# Patient Record
Sex: FEMALE | Race: WHITE | NOT HISPANIC OR LATINO | URBAN - METROPOLITAN AREA
[De-identification: names, ages, dates, MRNs, and addresses within clinical notes are randomized per-mention and may not be internally consistent; named-entity substitution may affect disease eponyms.]

---
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
## 2018-04-27 ENCOUNTER — OFFICE VISIT (OUTPATIENT)
Dept: FAMILY MEDICINE | Facility: CLINIC | Age: 18
End: 2018-04-27
Payer: COMMERCIAL

## 2018-04-27 VITALS
BODY MASS INDEX: 22.45 KG/M2 | OXYGEN SATURATION: 98 % | HEIGHT: 62 IN | TEMPERATURE: 99 F | SYSTOLIC BLOOD PRESSURE: 115 MMHG | HEART RATE: 103 BPM | DIASTOLIC BLOOD PRESSURE: 71 MMHG | WEIGHT: 122 LBS

## 2018-04-27 DIAGNOSIS — R10.31 BILATERAL LOWER ABDOMINAL PAIN: ICD-10-CM

## 2018-04-27 DIAGNOSIS — R30.0 DYSURIA: Primary | ICD-10-CM

## 2018-04-27 DIAGNOSIS — R10.32 BILATERAL LOWER ABDOMINAL PAIN: ICD-10-CM

## 2018-04-27 LAB
ALBUMIN UR-MCNC: NEGATIVE MG/DL
APPEARANCE UR: CLEAR
BILIRUB UR QL STRIP: NEGATIVE
COLOR UR AUTO: YELLOW
ERYTHROCYTE [DISTWIDTH] IN BLOOD BY AUTOMATED COUNT: 12 % (ref 10–15)
GLUCOSE UR STRIP-MCNC: NEGATIVE MG/DL
HCT VFR BLD AUTO: 41.1 % (ref 35–47)
HGB BLD-MCNC: 14 G/DL (ref 11.7–15.7)
HGB UR QL STRIP: NEGATIVE
KETONES UR STRIP-MCNC: NEGATIVE MG/DL
LEUKOCYTE ESTERASE UR QL STRIP: NEGATIVE
MCH RBC QN AUTO: 31.4 PG (ref 26.5–33)
MCHC RBC AUTO-ENTMCNC: 34.1 G/DL (ref 31.5–36.5)
MCV RBC AUTO: 92 FL (ref 78–100)
NITRATE UR QL: NEGATIVE
PH UR STRIP: 7 PH (ref 5–7)
PLATELET # BLD AUTO: 323 10E9/L (ref 150–450)
RBC # BLD AUTO: 4.46 10E12/L (ref 3.8–5.2)
SOURCE: NORMAL
SP GR UR STRIP: 1.01 (ref 1–1.03)
UROBILINOGEN UR STRIP-ACNC: 0.2 EU/DL (ref 0.2–1)
WBC # BLD AUTO: 11.6 10E9/L (ref 4–11)

## 2018-04-27 PROCEDURE — 85027 COMPLETE CBC AUTOMATED: CPT | Performed by: NURSE PRACTITIONER

## 2018-04-27 PROCEDURE — 36415 COLL VENOUS BLD VENIPUNCTURE: CPT | Performed by: NURSE PRACTITIONER

## 2018-04-27 PROCEDURE — 99213 OFFICE O/P EST LOW 20 MIN: CPT | Performed by: NURSE PRACTITIONER

## 2018-04-27 PROCEDURE — 81003 URINALYSIS AUTO W/O SCOPE: CPT | Performed by: NURSE PRACTITIONER

## 2018-04-27 ASSESSMENT — PAIN SCALES - GENERAL: PAINLEVEL: SEVERE PAIN (6)

## 2018-04-27 NOTE — PROGRESS NOTES
SUBJECTIVE:   Lashanda Calle is a 17 year old female who presents to clinic today for the following health issues:      Patient is here today with the concern for poss. Kidney stone. She has had in the past kidney stones 4 times, she is having low back pain and lower abd. Pain. New symptom is she is bleeding from the rectum and has diarrhea x 1 day    She woke up at 0300 today and had sudden urge to defecate  She had liquid stool with bloody mucus on toilet paper and in toilet bowl  She has passed several stools and 3 additional times had similar blood  Pain in lower abdomen radiates to bilateral low back  Had flank pain before with kidney stones but never lower abdomen pain (this feels different than previous kidney stones)  Denies rectal pain, itching  No history of hemorrhoids, anal fissure  LMP 4/21/18  She is not sexually active  Denies vaginal discharge, odor, pelvic pain  She has not taken any tylenol or ASA today  Feeling weak, does not feel like she may pass out    Her mother has ulcerative colitis    No antibiotics in past three months  No recent travel   She has not been around any sick contacts, not been to a hospital or LTC facility  No known exposure to cdiff      Problem list and histories reviewed & adjusted, as indicated.  Additional history: none    Patient Active Problem List   Diagnosis     Generalized anxiety disorder     Dysmenorrhea     Major depressive disorder, recurrent episode, moderate (H)     History reviewed. No pertinent surgical history.    Social History   Substance Use Topics     Smoking status: Never Smoker     Smokeless tobacco: Never Used     Alcohol use No     Family History   Problem Relation Age of Onset     Hypertension Father      DIABETES No family hx of      CEREBROVASCULAR DISEASE No family hx of      Breast Cancer No family hx of      Cancer - colorectal No family hx of      Prostate Cancer No family hx of      CANCER No family hx of      Cardiovascular No family hx of   "          Reviewed and updated as needed this visit by clinical staff  Tobacco  Allergies  Meds  Med Hx  Surg Hx  Fam Hx  Soc Hx      Reviewed and updated as needed this visit by Provider         ROS:  Constitutional, HEENT, cardiovascular, pulmonary, gi and gu systems are negative, except as otherwise noted.    OBJECTIVE:     /71 (BP Location: Left arm, Patient Position: Chair, Cuff Size: Adult Regular)  Pulse 103  Temp 99  F (37.2  C) (Oral)  Ht 5' 1.5\" (1.562 m)  Wt 122 lb (55.3 kg)  LMP 04/21/2018  SpO2 98%  Breastfeeding? No  BMI 22.68 kg/m2  Body mass index is 22.68 kg/(m^2).  GENERAL: healthy, alert and no distress  RESP: lungs clear to auscultation - no rales, rhonchi or wheezes  CV: regular rate and rhythm, normal S1 S2, no S3 or S4, no murmur, click or rub, no peripheral edema and peripheral pulses strong  ABDOMEN: soft, tenderness to palpation bilateral lower abdomen with guarding, no rebound tenderness, negative psoas, no hepatosplenomegaly, no masses and bowel sounds normal  BACK: no CVA tenderness    Diagnostic Test Results:  Results for orders placed or performed in visit on 04/27/18   UA reflex to Microscopic and Culture   Result Value Ref Range    Color Urine Yellow     Appearance Urine Clear     Glucose Urine Negative NEG^Negative mg/dL    Bilirubin Urine Negative NEG^Negative    Ketones Urine Negative NEG^Negative mg/dL    Specific Gravity Urine 1.010 1.003 - 1.035    Blood Urine Negative NEG^Negative    pH Urine 7.0 5.0 - 7.0 pH    Protein Albumin Urine Negative NEG^Negative mg/dL    Urobilinogen Urine 0.2 0.2 - 1.0 EU/dL    Nitrite Urine Negative NEG^Negative    Leukocyte Esterase Urine Negative NEG^Negative    Source Midstream Urine    CBC with platelets   Result Value Ref Range    WBC 11.6 (H) 4.0 - 11.0 10e9/L    RBC Count 4.46 3.8 - 5.2 10e12/L    Hemoglobin 14.0 11.7 - 15.7 g/dL    Hematocrit 41.1 35.0 - 47.0 %    MCV 92 78 - 100 fl    MCH 31.4 26.5 - 33.0 pg    MCHC " 34.1 31.5 - 36.5 g/dL    RDW 12.0 10.0 - 15.0 %    Platelet Count 323 150 - 450 10e9/L       ASSESSMENT/PLAN:       ICD-10-CM    1. Dysuria R30.0 UA reflex to Microscopic and Culture     CBC with platelets     CT Abdomen Pelvis w Contrast     CANCELED: CT Abdomen w Contrast   2. Bilateral lower abdominal pain R10.31 CT Abdomen Pelvis w Contrast    R10.32 CANCELED: CT Abdomen w Contrast     As we are approaching the weekend, CT can not be done until Monday. She is currently stable, slightly elevated WBC with tachycardia. Not consistent with appendicitis so I don't think it's necessary to go to ED. Recommend clear liquid diet. She is scheduled for CT Monday morning. If symptoms worsen over the weekend she will go to ED. She voiced understanding.     Patient Instructions   I recommend a clear liquid diet (no red)  If symptoms worsen over the weekend, you MUST go to the emergency room. If worsening pain, nausea, vomiting, more frequent passage of blood in stool, feeling lightheaded like you must pass out, fever                                 NITISH Chiang [facility]    This encounter has been reviewed.  The patient was not examined by me.  NIURKA CHURCH M.D.   Supervising Physician in Internal Medicine, [facility].

## 2018-04-27 NOTE — PATIENT INSTRUCTIONS
I recommend a clear liquid diet (no red)  If symptoms worsen over the weekend, you MUST go to the emergency room. If worsening pain, nausea, vomiting, more frequent passage of blood in stool, feeling lightheaded like you must pass out, fever

## 2018-04-27 NOTE — MR AVS SNAPSHOT
After Visit Summary   4/27/2018    Lashanda Calle    MRN: [id number]           Patient Information     Date Of Birth          2000        Visit Information        Provider Department      4/27/2018 1:40 PM Li Klein APRN CNP [facility]        Today's Diagnoses     Dysuria    -  1    Bilateral lower abdominal pain          Care Instructions    I recommend a clear liquid diet (no red)  If symptoms worsen over the weekend, you MUST go to the emergency room. If worsening pain, nausea, vomiting, more frequent passage of blood in stool, feeling lightheaded like you must pass out, fever                                     Follow-ups after your visit        Your next 10 appointments already scheduled     Apr 30, 2018 10:30 AM CDT   (Arrive by 10:15 AM)   CT [facility]BD[facility] W [facility]CT[facility]    [address] [facility] [address] [phone]           Please bring any scans or X-rays taken at other hospitals, if similar tests were done. Also bring a list of your medicines, including vitamins, minerals and over-the-counter drugs. It is safest to leave personal items at home.  Be sure to tell your doctor:   If you have any allergies.   If there s any chance you are pregnant.   If you are breastfeeding.  How to prepare:   Do not eat or drink for 2 hours before your exam. If you need to take medicine, you may take it with small sips of water. (We may ask you to take liquid medicine as well.)   Please wear loose clothing, such as a sweat suit or jogging clothes. Avoid snaps, zippers and other metal. We may ask you to undress and put on a hospital gown.  Please arrive 30 minutes early for your CT. Once in the department you might be asked to drink water 15-20 minutes prior to your exam.  If indicated you may be asked to drink an oral contrast in advance of your CT.  If this is the case, the imaging team will let  "you know or be in contact with you prior to your appointment  Patients over 70 or patients with diabetes or kidney problems:   If you haven t had a blood test (creatinine test) within the last 30 days, the Cardiologist/Radiologist may require you to get this test prior to your exam.  If you have diabetes:   Continue to take your metformin medication on the day of your exam  If you have any questions, please call the Imaging Department where you will have your exam.              Future tests that were ordered for you today     Open Future Orders        Priority Expected Expires Ordered    CT Abdomen w Contrast Routine  4/27/2019 4/27/2018            Who to contact     If you have questions or need follow up information about today's clinic visit or your schedule please contact [facility] directly at [phone].  Normal or non-critical lab and imaging results will be communicated to you by MyChart, letter or phone within 4 business days after the clinic has received the results. If you do not hear from us within 7 days, please contact the clinic through MyChart or phone. If you have a critical or abnormal lab result, we will notify you by phone as soon as possible.  Submit refill requests through [facility] or call your pharmacy and they will forward the refill request to us. Please allow 3 business days for your refill to be completed.          Additional Information About Your Visit        [facility] Information     [facility] lets you send messages to your doctor, view your test results, renew your prescriptions, schedule appointments and more. To sign up, go to [email or web address] . Click on \"Log in\" on the left side of the screen, which will take you to the Welcome page. Then click on \"Sign up Now\" on the right side of the page.     You will be asked to enter the access code listed below, as well as some personal information. Please follow the directions to create your username and " "password.     Your access code is: [email or web address]  Expires: 2018  2:49 PM     Your access code will  in 90 days. If you need help or a new code, please call your [facility] clinic or [phone].        Care EveryWhere ID     This is your Care EveryWhere ID. This could be used by other organizations to access your [facility] medical records  [id number]        Your Vitals Were     Pulse Temperature Height Last Period Pulse Oximetry Breastfeeding?    103 99  F (37.2  C) (Oral) 5' 1.5\" (1.562 m) 2018 98% No    BMI (Body Mass Index)                   22.68 kg/m2            Blood Pressure from Last 3 Encounters:   18 115/71   17 117/78   17 121/75    Weight from Last 3 Encounters:   18 122 lb (55.3 kg) (38 %)*   17 118 lb (53.5 kg) (32 %)*   17 119 lb (54 kg) (34 %)*     * Growth percentiles are based on CDC 2-20 Years data.              We Performed the Following     CBC with platelets     UA reflex to Microscopic and Culture        Primary Care Provider Office Phone # Fax #    Nuria Turner PA-C [phone]       [address] [facility] [address]        Equal Access to Services     CAMI HALE : Hadii aad ku hadasho Soanahi, waaxda luqadaha, qaybta kaalmada shubham, nano hollins . So [facility] [phone].    ATENCIÓN: Si habla español, tiene a naylor disposición servicios gratuitos de asistencia lingüística. Jose al [phone].    We comply with applicable federal civil rights laws and [place] laws. We do not discriminate on the basis of race, color, national origin, age, disability, sex, sexual orientation, or gender identity.            Thank you!     Thank you for choosing [facility]  for your care. Our goal is always to provide you with excellent care. Hearing back from our patients is one way we can continue to improve our services. Please take a few minutes to complete the " written survey that you may receive in the mail after your visit with us. Thank you!             Your Updated Medication List - Protect others around you: Learn how to safely use, store and throw away your medicines at www.disposemymeds.org.          This list is accurate as of 4/27/18  2:51 PM.  Always use your most recent med list.                   Brand Name Dispense Instructions for use Diagnosis    FLUoxetine 20 MG capsule    PROzac    30 capsule    Take 1 capsule (20 mg) by mouth daily    Major depressive disorder, recurrent episode, moderate (H), Generalized anxiety disorder       norgestimate-ethinyl estradiol 0.25-35 MG-MCG per tablet    ORTHO-CYCLEN, SPRINTEC    84 tablet    Take 1 tablet by mouth daily    Irregular periods, Dysmenorrhea

## 2018-04-27 NOTE — LETTER
[address] [address]  Phone: [phone]  Fax: [phone]    April 27, 2018        Lashanda Calle  [address] [place] [id number]          To whom it may concern:    RE: Lashanda Calle    Patient was seen and treated today at our clinic. Please excuse her absences from work April 27-April 30.     Please contact me for questions or concerns.      Sincerely,        NITISH Chiang CNP

## 2018-04-28 ASSESSMENT — PATIENT HEALTH QUESTIONNAIRE - PHQ9: SUM OF ALL RESPONSES TO PHQ QUESTIONS 1-9: 5

## 2018-04-30 ENCOUNTER — RADIANT APPOINTMENT (OUTPATIENT)
Dept: CT IMAGING | Facility: CLINIC | Age: 18
End: 2018-04-30
Attending: NURSE PRACTITIONER
Payer: COMMERCIAL

## 2018-04-30 DIAGNOSIS — R10.31 BILATERAL LOWER ABDOMINAL PAIN: ICD-10-CM

## 2018-04-30 DIAGNOSIS — R30.0 DYSURIA: ICD-10-CM

## 2018-04-30 DIAGNOSIS — R10.32 BILATERAL LOWER ABDOMINAL PAIN: ICD-10-CM

## 2018-04-30 PROCEDURE — 74177 CT ABD & PELVIS W/CONTRAST: CPT | Mod: TC

## 2018-04-30 RX ORDER — IOPAMIDOL 755 MG/ML
59 INJECTION, SOLUTION INTRAVASCULAR ONCE
Status: COMPLETED | OUTPATIENT
Start: 2018-04-30 | End: 2018-04-30

## 2018-04-30 RX ADMIN — IOPAMIDOL 59 ML: 755 INJECTION, SOLUTION INTRAVASCULAR at 10:44

## 2018-05-01 ENCOUNTER — TELEPHONE (OUTPATIENT)
Dept: FAMILY MEDICINE | Facility: CLINIC | Age: 18
End: 2018-05-01

## 2018-05-01 DIAGNOSIS — R91.1 LUNG NODULE: Primary | ICD-10-CM

## 2018-05-01 NOTE — TELEPHONE ENCOUNTER
Patient returned call, I lynced Li but no response.  Advised patient of both part of note regarding CT.   She says her pain is resolving but still constipated, last BM was Sunday.   She was planning to get some over the counter med for this after work today.   I advised perhaps senna and colace.  Also increase fluids and fiber.    She does not sound too worried about the lung issue and verbalized understanding of routine CT to follow up on that in about a year.    Routed back to Li to advise on any other advice or follow up needed.    Erika Shah RN  [facility]

## 2018-05-01 NOTE — TELEPHONE ENCOUNTER
I left voicemail for patient to return call to clinic.  If I'm not available please relay the message:    Abdominal CT did not reveal any acute abnormalities to explain her pain and change in stool. No signs of colitis, appendicitis, diverticulitis. How are her symptoms? If she is continuing to have symptoms I will refer her to GI. Please let me know if I can put in a referral.    The CT did show an incidental lung nodule of left lower lobe. Sometimes when we do imaging we find other things that we are not looking for and they often times are benign. She is very low risk for this being a concerning nodule. Even so, I would like to do a chest CT in 1 year just to further evaluate the lung nodule and see if it is changing. I ordered it, but this could be done sometime next year late winter.

## 2018-07-12 DIAGNOSIS — N94.6 DYSMENORRHEA: ICD-10-CM

## 2018-07-12 DIAGNOSIS — N92.6 IRREGULAR PERIODS: ICD-10-CM

## 2018-07-12 NOTE — TELEPHONE ENCOUNTER
"Requested Prescriptions   Pending Prescriptions Disp Refills     MONO-LINYAH 0.25-35 MG-MCG per tablet [Pharmacy Med Name: MONO-LINYAH 0.25-35 MG-MCG TABS] 84 tablet 3    Last Written Prescription Date:  8/22/17  Last Fill Quantity: 84,  # refills: 3   Last office visit: 4/27/2018 with prescribing provider:     Future Office Visit:     Sig: TAKE ONE TABLET BY MOUTH EVERY DAY    Contraceptives Protocol Passed    7/12/2018  4:52 PM       Passed - Patient is not a current smoker if age is 35 or older       Passed - Recent (12 mo) or future (30 days) visit within the authorizing provider's specialty    Patient had office visit in the last 12 months or has a visit in the next 30 days with authorizing provider or within the authorizing provider's specialty.  See \"Patient Info\" tab in inbasket, or \"Choose Columns\" in Meds & Orders section of the refill encounter.           Passed - No active pregnancy on record       Passed - No positive pregnancy test in past 12 months          "

## 2018-07-14 RX ORDER — NORGESTIMATE AND ETHINYL ESTRADIOL 0.25-0.035
KIT ORAL
Qty: 84 TABLET | Refills: 3 | Status: SHIPPED | OUTPATIENT
Start: 2018-07-14 | End: 2019-06-13

## 2018-10-23 ENCOUNTER — OFFICE VISIT (OUTPATIENT)
Dept: FAMILY MEDICINE | Facility: CLINIC | Age: 18
End: 2018-10-23
Payer: COMMERCIAL

## 2018-10-23 VITALS
DIASTOLIC BLOOD PRESSURE: 69 MMHG | SYSTOLIC BLOOD PRESSURE: 110 MMHG | OXYGEN SATURATION: 98 % | TEMPERATURE: 98.4 F | WEIGHT: 125 LBS | BODY MASS INDEX: 23.24 KG/M2 | HEART RATE: 66 BPM

## 2018-10-23 DIAGNOSIS — Z11.3 SCREENING EXAMINATION FOR VENEREAL DISEASE: ICD-10-CM

## 2018-10-23 DIAGNOSIS — F41.1 GENERALIZED ANXIETY DISORDER: ICD-10-CM

## 2018-10-23 DIAGNOSIS — F33.1 MAJOR DEPRESSIVE DISORDER, RECURRENT EPISODE, MODERATE (H): ICD-10-CM

## 2018-10-23 DIAGNOSIS — N89.8 VAGINAL DISCHARGE: Primary | ICD-10-CM

## 2018-10-23 DIAGNOSIS — B37.31 YEAST INFECTION OF THE VAGINA: ICD-10-CM

## 2018-10-23 LAB
SPECIMEN SOURCE: ABNORMAL
WET PREP SPEC: ABNORMAL

## 2018-10-23 PROCEDURE — 99213 OFFICE O/P EST LOW 20 MIN: CPT | Performed by: PHYSICIAN ASSISTANT

## 2018-10-23 PROCEDURE — 87591 N.GONORRHOEAE DNA AMP PROB: CPT | Performed by: PHYSICIAN ASSISTANT

## 2018-10-23 PROCEDURE — 87491 CHLMYD TRACH DNA AMP PROBE: CPT | Performed by: PHYSICIAN ASSISTANT

## 2018-10-23 PROCEDURE — 87210 SMEAR WET MOUNT SALINE/INK: CPT | Performed by: PHYSICIAN ASSISTANT

## 2018-10-23 RX ORDER — FLUCONAZOLE 150 MG/1
150 TABLET ORAL ONCE
Qty: 1 TABLET | Refills: 0 | Status: SHIPPED | OUTPATIENT
Start: 2018-10-23 | End: 2019-04-08

## 2018-10-23 ASSESSMENT — ANXIETY QUESTIONNAIRES
7. FEELING AFRAID AS IF SOMETHING AWFUL MIGHT HAPPEN: NOT AT ALL
IF YOU CHECKED OFF ANY PROBLEMS ON THIS QUESTIONNAIRE, HOW DIFFICULT HAVE THESE PROBLEMS MADE IT FOR YOU TO DO YOUR WORK, TAKE CARE OF THINGS AT HOME, OR GET ALONG WITH OTHER PEOPLE: SOMEWHAT DIFFICULT
1. FEELING NERVOUS, ANXIOUS, OR ON EDGE: MORE THAN HALF THE DAYS
6. BECOMING EASILY ANNOYED OR IRRITABLE: MORE THAN HALF THE DAYS
GAD7 TOTAL SCORE: 7
2. NOT BEING ABLE TO STOP OR CONTROL WORRYING: SEVERAL DAYS
3. WORRYING TOO MUCH ABOUT DIFFERENT THINGS: SEVERAL DAYS
5. BEING SO RESTLESS THAT IT IS HARD TO SIT STILL: NOT AT ALL

## 2018-10-23 ASSESSMENT — PATIENT HEALTH QUESTIONNAIRE - PHQ9: 5. POOR APPETITE OR OVEREATING: SEVERAL DAYS

## 2018-10-23 NOTE — LETTER
[facility]   [address] [place]  [address]  [phone]                                   October 26, 2018    Lashanda Calle  [address] [place] [address]        Dear Lashanda,    STD screening was negative.     Results for orders placed or performed in visit on 10/23/18   NEISSERIA GONORRHOEA PCR   Result Value Ref Range    Specimen Descrip Urine     N Gonorrhea PCR Negative NEG^Negative   CHLAMYDIA TRACHOMATIS PCR   Result Value Ref Range    Specimen Description Urine     Chlamydia Trachomatis PCR Negative NEG^Negative   Wet prep   Result Value Ref Range    Specimen Description Vagina     Wet Prep No Trichomonas seen     Wet Prep No clue cells seen     Wet Prep Yeast seen (A)        If you have any questions please call the clinic at [phone]    Sincerely,    Nuria Turner PA-C  bmd

## 2018-10-23 NOTE — MR AVS SNAPSHOT
After Visit Summary   10/23/2018    Lashanda Calle    MRN: [id number]           Patient Information     Date Of Birth          2000        Visit Information        Provider Department      10/23/2018 3:40 PM Nuria Turner PA-C [facility]        Today's Diagnoses     Vaginal discharge    -  1    Screening examination for venereal disease        Major depressive disorder, recurrent episode, moderate (H)        Generalized anxiety disorder        Yeast infection of the vagina          Care Instructions    Billing/ Insurance    There will be a charge that will be billed to your insurance company. If your insurance does not cover it, it will be billed to you. It is charged based on the time spent on the telephone with you in increments of 10 minutes of medical discussion. You may wish to check with your insurance company to see if they cover telephone visits.    Cost of Phone Visits/ E-Visits    5 to 10 minutes is $30.00  11 to 20 minutes is $59.00  21 to 30 minutes is $85.00     E-Visit is $35.00    Before Visit    Before your visit with the provider, a staff person will call you to obtain a second verbal consent, verify your insurance company, home address, and phone number. We will also need to update your medication list and allergies.    In the end, if you wish to see the provider in the office instead, we can help you make your in person appointment. Through [employer], you can also send and E-Visit Request and the provider will respond to you within 24 hours.            Follow-ups after your visit        Follow-up notes from your care team     Return in about 4 weeks (around 11/20/2018) for mood.      Who to contact     If you have questions or need follow up information about today's clinic visit or your schedule please contact [facility] directly at [phone].  Normal or non-critical lab and imaging results will be communicated to you by  MyChart, letter or phone within 4 business days after the clinic has received the results. If you do not hear from us within 7 days, please contact the clinic through MyChart or phone. If you have a critical or abnormal lab result, we will notify you by phone as soon as possible.  Submit refill requests through [employer] or call your pharmacy and they will forward the refill request to us. Please allow 3 business days for your refill to be completed.          Additional Information About Your Visit        Care EveryWhere ID     This is your Care EveryWhere ID. This could be used by other organizations to access your [facility] medical records  [id number]        Your Vitals Were     Pulse Temperature Last Period Pulse Oximetry BMI (Body Mass Index)       66 98.4  F (36.9  C) (Oral) 09/23/2018 98% 23.24 kg/m2        Blood Pressure from Last 3 Encounters:   10/23/18 110/69   04/27/18 115/71   09/12/17 117/78    Weight from Last 3 Encounters:   10/23/18 125 lb (56.7 kg)   04/27/18 122 lb (55.3 kg) (38 %)*   09/12/17 118 lb (53.5 kg) (32 %)*     * Growth percentiles are based on CDC 2-20 Years data.              We Performed the Following     CHLAMYDIA TRACHOMATIS PCR     DEPRESSION ACTION PLAN (DAP)     NEISSERIA GONORRHOEA PCR     Wet prep          Today's Medication Changes          These changes are accurate as of 10/23/18  4:38 PM.  If you have any questions, ask your nurse or doctor.               Start taking these medicines.        Dose/Directions    fluconazole 150 MG tablet   Commonly known as:  DIFLUCAN   Used for:  Yeast infection of the vagina   Started by:  Nuria Turner PA-C        Dose:  150 mg   Take 1 tablet (150 mg) by mouth once for 1 dose   Quantity:  1 tablet   Refills:  0            Where to get your medicines      These medications were sent to [facility] - [place], [place] - [address]  [address], [facility] [address]     Phone:   [phone]     fluconazole 150 MG tablet    FLUoxetine 20 MG capsule                Primary Care Provider Office Phone # Fax #    Nuria Turner PA-C [phone]       [address] [facility] [address]        Equal Access to Services     CAMI HALE : Hadii prashant galvez morgano Soomaali, waaxda luqadaha, qaybta kaalmada adeegyada, waxanibal davidson rhyscassandra bustillostannerjuaquin looney. So [facility] [phone].    ATENCIÓN: Si habla español, tiene a naylor disposición servicios gratuitos de asistencia lingüística. Llame al [phone].    We comply with applicable federal civil rights laws and [place] laws. We do not discriminate on the basis of race, color, national origin, age, disability, sex, sexual orientation, or gender identity.            Thank you!     Thank you for choosing [facility]  for your care. Our goal is always to provide you with excellent care. Hearing back from our patients is one way we can continue to improve our services. Please take a few minutes to complete the written survey that you may receive in the mail after your visit with us. Thank you!             Your Updated Medication List - Protect others around you: Learn how to safely use, store and throw away your medicines at www.disposemymeds.org.          This list is accurate as of 10/23/18  4:38 PM.  Always use your most recent med list.                   Brand Name Dispense Instructions for use Diagnosis    fluconazole 150 MG tablet    DIFLUCAN    1 tablet    Take 1 tablet (150 mg) by mouth once for 1 dose    Yeast infection of the vagina       FLUoxetine 20 MG capsule    PROzac    30 capsule    Take 1 capsule (20 mg) by mouth daily    Major depressive disorder, recurrent episode, moderate (H), Generalized anxiety disorder       MONO-LINYAH 0.25-35 MG-MCG per tablet   Generic drug:  norgestimate-ethinyl estradiol     84 tablet    TAKE ONE TABLET BY MOUTH EVERY DAY    Irregular periods, Dysmenorrhea

## 2018-10-23 NOTE — PATIENT INSTRUCTIONS
Billing/ Insurance    There will be a charge that will be billed to your insurance company. If your insurance does not cover it, it will be billed to you. It is charged based on the time spent on the telephone with you in increments of 10 minutes of medical discussion. You may wish to check with your insurance company to see if they cover telephone visits.    Cost of Phone Visits/ E-Visits    5 to 10 minutes is $30.00  11 to 20 minutes is $59.00  21 to 30 minutes is $85.00     E-Visit is $35.00    Before Visit    Before your visit with the provider, a staff person will call you to obtain a second verbal consent, verify your insurance company, home address, and phone number. We will also need to update your medication list and allergies.    In the end, if you wish to see the provider in the office instead, we can help you make your in person appointment. Through [employer], you can also send and E-Visit Request and the provider will respond to you within 24 hours.

## 2018-10-23 NOTE — LETTER
My Depression Action Plan  Name: Lashanda Calle   Date of Birth 2000  Date: 10/23/2018    My doctor: Nuria Turner   My clinic: [address] [id number] [phone]          GREEN    ZONE   Good Control    What it looks like:     Things are going generally well. You have normal up s and down s. You may even feel depressed from time to time, but bad moods usually last less than a day.   What you need to do:  1. Continue to care for yourself (see self care plan)  2. Check your depression survival kit and update it as needed  3. Follow your physician s recommendations including any medication.  4. Do not stop taking medication unless you consult with your physician first.           YELLOW         ZONE Getting Worse    What it looks like:     Depression is starting to interfere with your life.     It may be hard to get out of bed; you may be starting to isolate yourself from others.    Symptoms of depression are starting to last most all day and this has happened for several days.     You may have suicidal thoughts but they are not constant.   What you need to do:     1. Call your care team, your response to treatment will improve if you keep your care team informed of your progress. Yellow periods are signs an adjustment may need to be made.     2. Continue your self-care, even if you have to fake it!    3. Talk to someone in your support network    4. Open up your depression survival kit           RED    ZONE Medical Alert - Get Help    What it looks like:     Depression is seriously interfering with your life.     You may experience these or other symptoms: You can t get out of bed most days, can t work or engage in other necessary activities, you have trouble taking care of basic hygiene, or basic responsibilities, thoughts of suicide or death that will not go away, self-injurious behavior.     What you need to  do:  1. Call your care team and request a same-day appointment. If they are not available (weekends or after hours) call your local crisis line, emergency room or 911.            Depression Self Care Plan / Survival Kit    Self-Care for Depression  Here s the deal. Your body and mind are really not as separate as most people think.  What you do and think affects how you feel and how you feel influences what you do and think. This means if you do things that people who feel good do, it will help you feel better.  Sometimes this is all it takes.  There is also a place for medication and therapy depending on how severe your depression is, so be sure to consult with your medical provider and/ or Behavioral Health Consultant if your symptoms are worsening or not improving.     In order to better manage my stress, I will:    Exercise  Get some form of exercise, every day. This will help reduce pain and release endorphins, the  feel good  chemicals in your brain. This is almost as good as taking antidepressants!  This is not the same as joining a gym and then never going! (they count on that by the way ) It can be as simple as just going for a walk or doing some gardening, anything that will get you moving.      Hygiene   Maintain good hygiene (Get out of bed in the morning, Make your bed, Brush your teeth, Take a shower, and Get dressed like you were going to work, even if you are unemployed).  If your clothes don't fit try to get ones that do.    Diet  I will strive to eat foods that are good for me, drink plenty of water, and avoid excessive sugar, caffeine, alcohol, and other mood-altering substances.  Some foods that are helpful in depression are: complex carbohydrates, B vitamins, flaxseed, fish or fish oil, fresh fruits and vegetables.    Psychotherapy  I agree to participate in Individual Therapy (if recommended).    Medication  If prescribed medications, I agree to take them.  Missing doses can result in serious  side effects.  I understand that drinking alcohol, or other illicit drug use, may cause potential side effects.  I will not stop my medication abruptly without first discussing it with my provider.    Staying Connected With Others  I will stay in touch with my friends, family members, and my primary care provider/team.    Use your imagination  Be creative.  We all have a creative side; it doesn t matter if it s oil painting, sand castles, or mud pies! This will also kick up the endorphins.    Witness Beauty  (AKA stop and smell the roses) Take a look outside, even in mid-winter. Notice colors, textures. Watch the squirrels and birds.     Service to others  Be of service to others.  There is always someone else in need.  By helping others we can  get out of ourselves  and remember the really important things.  This also provides opportunities for practicing all the other parts of the program.    Humor  Laugh and be silly!  Adjust your TV habits for less news and crime-drama and more comedy.    Control your stress  Try breathing deep, massage therapy, biofeedback, and meditation. Find time to relax each day.     My support system    Clinic Contact:  Phone number:    Contact 1:  Phone number:    Contact 2:  Phone number:    Synagogue/:  Phone number:    Therapist:  Phone number:    Local crisis center:    Phone number:    Other community support:  Phone number:

## 2018-10-24 ASSESSMENT — ANXIETY QUESTIONNAIRES: GAD7 TOTAL SCORE: 7

## 2018-10-24 ASSESSMENT — PATIENT HEALTH QUESTIONNAIRE - PHQ9: SUM OF ALL RESPONSES TO PHQ QUESTIONS 1-9: 4

## 2019-03-26 ENCOUNTER — TELEPHONE (OUTPATIENT)
Dept: FAMILY MEDICINE | Facility: CLINIC | Age: 19
End: 2019-03-26

## 2019-03-26 DIAGNOSIS — R09.89 SINUS COMPLAINT: Primary | ICD-10-CM

## 2019-03-26 NOTE — TELEPHONE ENCOUNTER
See contact information on referral sent today:    Comments     Your provider has referred you to: [facility]: [facility] [phone]   [email or web address]     Attempted to call patient at home/mobile number, left brief message on voicemail advising patient of phone number contact for requested referral; patient was instructed to return call to [facility] [place] RN directly on the RN call back line at [phone] only if she has further questions.   Erika Shah, RN  [facility]

## 2019-03-26 NOTE — TELEPHONE ENCOUNTER
I called patient back, she is willing to see ENT but has not seen one before and has not established any relationship with one in the past so she wants a referral.   She says within [facility] is fine with her.    Routed to PCP to place ENT referral.    Erika Shah RN  [facility]

## 2019-03-26 NOTE — TELEPHONE ENCOUNTER
"Patient was last seen 10/23/18 by Nuria Turner PA-C, phone or E-visit for \"mood\" was advised in 4 weeks from that date.    Has not been done.    Routed to Nuria Turner PA-C, would you be able to address the sinus \"bump\" as well via phone or e-visit?    Erika Shah RN  [facility]      "

## 2019-03-26 NOTE — TELEPHONE ENCOUNTER
Reason for Call:  Other / ENT referral    Detailed comments: Patient called and stated when they did XRays to remove her wisdom tooth there was an abnormal bump on her sinuses.  Patient also stated she would like to know if this is something her PCP would like to check into or if she would have to be referred to an ENT doctor.  Please call patient back to discuss.    Phone Number Patient can be reached at: Cell number on file:    Telephone Information:   Mobile [phone]     Best Time: Anytime    Can we leave a detailed message on this number? YES    Call taken on 3/26/2019 at 4:12 PM by Amalia Hameed

## 2019-04-08 ENCOUNTER — OFFICE VISIT (OUTPATIENT)
Dept: OTOLARYNGOLOGY | Facility: CLINIC | Age: 19
End: 2019-04-08
Payer: COMMERCIAL

## 2019-04-08 VITALS
OXYGEN SATURATION: 100 % | SYSTOLIC BLOOD PRESSURE: 124 MMHG | HEIGHT: 62 IN | BODY MASS INDEX: 23.55 KG/M2 | DIASTOLIC BLOOD PRESSURE: 80 MMHG | WEIGHT: 128 LBS | HEART RATE: 70 BPM

## 2019-04-08 DIAGNOSIS — J34.89 MAXILLARY SINUS MASS: Primary | ICD-10-CM

## 2019-04-08 PROCEDURE — 99203 OFFICE O/P NEW LOW 30 MIN: CPT | Performed by: OTOLARYNGOLOGY

## 2019-04-08 ASSESSMENT — MIFFLIN-ST. JEOR: SCORE: 1295.91

## 2019-04-08 NOTE — LETTER
4/8/2019         RE: Lashanda Calle  [address] [place] [address]        Dear Colleague,    Thank you for referring your patient, Lashanda Calle, to the [facility]. Please see a copy of my visit note below.    History of Present Illness - Lashanda Calle is a 18 year old female here to see me for the first time at the consultation of Rich Turner for sinus problems.    More specifically recently when she was being worked up for wisdom teeth removal, it was noted to her that there was a bump in her maxillary sinuses and it was suggested she follow up with ENT to have this examined and worked up.    She tells me that she has no history of chronic sinus disease.  No chronic nasal congestion, no recurrent sinus infections.  No chronic post nasal drainage, no changes in sense of smell or taste, no chronic facial pain.  However, she does get chronic frontal headache in the middle of her forehead for the last several months.      Past Medical History -   Patient Active Problem List   Diagnosis     Generalized anxiety disorder     Dysmenorrhea     Major depressive disorder, recurrent episode, moderate (H)       Current Medications -   Current Outpatient Medications:      FLUoxetine (PROZAC) 20 MG capsule, Take 1 capsule (20 mg) by mouth daily, Disp: 30 capsule, Rfl: 1     MONO-LINYAH 0.25-35 MG-MCG per tablet, TAKE ONE TABLET BY MOUTH EVERY DAY, Disp: 84 tablet, Rfl: 3    Allergies - No Known Allergies    Social History -   Social History     Socioeconomic History     Marital status: Single     Spouse name: Not on file     Number of children: Not on file     Years of education: Not on file     Highest education level: Not on file   Occupational History     Not on file   Social Needs     Financial resource strain: Not on file     Food insecurity:     Worry: Not on file     Inability: Not on file     Transportation needs:     Medical: Not on file     Non-medical: Not on file   Tobacco Use     Smoking status:  "Never Smoker     Smokeless tobacco: Never Used   Substance and Sexual Activity     Alcohol use: No     Drug use: Yes     Types: Marijuana     Sexual activity: Yes     Partners: Male     Birth control/protection: Condom, Pill   Lifestyle     Physical activity:     Days per week: Not on file     Minutes per session: Not on file     Stress: Not on file   Relationships     Social connections:     Talks on phone: Not on file     Gets together: Not on file     Attends Adventism service: Not on file     Active member of club or organization: Not on file     Attends meetings of clubs or organizations: Not on file     Relationship status: Not on file     Intimate partner violence:     Fear of current or ex partner: Not on file     Emotionally abused: Not on file     Physically abused: Not on file     Forced sexual activity: Not on file   Other Topics Concern     Parent/sibling w/ CABG, MI or angioplasty before 65F 55M? Not Asked   Social History Narrative     Not on file       Family History -   Family History   Problem Relation Age of Onset     Hypertension Father      Diabetes No family hx of      Cerebrovascular Disease No family hx of      Breast Cancer No family hx of      Cancer - colorectal No family hx of      Prostate Cancer No family hx of      Cancer No family hx of      Cardiovascular No family hx of        Review of Systems - As per HPI and PMHx, otherwise 10+ system review of the head and neck, and general constitution is negative.    Physical Exam  /80   Pulse 70   Ht 1.562 m (5' 1.5\")   Wt 58.1 kg (128 lb)   SpO2 100%   BMI 23.79 kg/m       General - The patient is well nourished and well developed, and appears to have good nutritional status.  Alert and oriented to person and place, answers questions and cooperates with examination appropriately.   Head and Face - Normocephalic and atraumatic, with no gross asymmetry noted of the contour of the facial features.  The facial nerve is intact, with " strong symmetric movements.  Voice and Breathing - The patient was breathing comfortably without the use of accessory muscles. There was no wheezing, stridor, or stertor.  The patients voice was clear and strong, and had appropriate pitch and quality.  Ears - The tympanic membranes are normal in appearance, bony landmarks are intact.  No retraction, perforation, or masses.  No fluid or purulence was seen in the external canal or the middle ear. No evidence of infection of the middle ear or external canal, cerumen was normal in appearance.  Eyes - Extraocular movements intact, and the pupils were reactive to light.  Sclera were not icteric or injected, conjunctiva were pink and moist.  Mouth - Examination of the oral cavity showed pink, healthy oral mucosa. No lesions or ulcerations noted.  The tongue was mobile and midline, and the dentition were in good condition.    Throat - The walls of the oropharynx were smooth, pink, moist, symmetric, and had no lesions or ulcerations.  The tonsillar pillars and soft palate were symmetric.  The uvula was midline on elevation.    Neck - Normal midline excursion of the laryngotracheal complex during swallowing.  Full range of motion on passive movement.  Palpation of the occipital, submental, submandibular, internal jugular chain, and supraclavicular nodes did not demonstrate any abnormal lymph nodes or masses.  The carotid pulse was palpable bilaterally.  Palpation of the thyroid was soft and smooth, with no nodules or goiter appreciated.  The trachea was mobile and midline.  Nose - External contour is symmetric, no gross deflection or scars.  Nasal mucosa is pink and moist with no abnormal mucus.  The septum was midline and non-obstructive, turbinates of normal size and position.  No polyps, masses, or purulence noted on examination.      A/P - Lashanda Calle is a 18 year old female  (R22.0) Maxillary sinus mass  (primary encounter diagnosis)    I have discussed with Lashanda that  almost certainly they were referring to a mucocele.  I can offer a sinus CT, but to be cost effective, if she can get me a copy of the panorex or dental films, I am willing to look at those and see if they are sufficient for me to render an opinion or at least get a baseline for what it might be.    She will try to get those to me and I will call her once I have seen them.    Again, thank you for allowing me to participate in the care of your patient.        Sincerely,        Gonzalez Ferrer MD

## 2019-04-08 NOTE — PATIENT INSTRUCTIONS
Scheduling Information  To schedule your CT/MRI scan, please contact [facility] at [phone] OR [facility] at [phone]    To schedule your Surgery, please contact our Specialty Schedulers at [phone]      ENT Clinic Locations Clinic Hours Telephone Number     [facility]  [address] [place] [address]   Monday:           1:00pm -- 5:00pm    Friday:              8:00am - 12:00pm   To schedule/reschedule an appointment with   Dr. Ferrer,   please contact our   Specialty Scheduling Department at:     [phone]       [facility]  [address] [place] [address] Tuesday:          8:00am -- 2:00pm         Urgent Care Locations Clinic Hours Telephone Numbers     [facility]  [address] [place] [address]     Monday-Friday:     11:00am - 9:00pm    Saturday-Sunday:  9:00am - 5:00pm   [phone]     [facility]  [address] [place] [address]     Monday-Friday:      5:00pm - 9:00pm     Saturday-Sunday:  9:00am - 5:00pm   [phone]

## 2019-04-08 NOTE — PROGRESS NOTES
History of Present Illness - Lashanda Calle is a 18 year old female here to see me for the first time at the consultation of Rich Turner for sinus problems.    More specifically recently when she was being worked up for wisdom teeth removal, it was noted to her that there was a bump in her maxillary sinuses and it was suggested she follow up with ENT to have this examined and worked up.    She tells me that she has no history of chronic sinus disease.  No chronic nasal congestion, no recurrent sinus infections.  No chronic post nasal drainage, no changes in sense of smell or taste, no chronic facial pain.  However, she does get chronic frontal headache in the middle of her forehead for the last several months.      Past Medical History -   Patient Active Problem List   Diagnosis     Generalized anxiety disorder     Dysmenorrhea     Major depressive disorder, recurrent episode, moderate (H)       Current Medications -   Current Outpatient Medications:      FLUoxetine (PROZAC) 20 MG capsule, Take 1 capsule (20 mg) by mouth daily, Disp: 30 capsule, Rfl: 1     MONO-LINYAH 0.25-35 MG-MCG per tablet, TAKE ONE TABLET BY MOUTH EVERY DAY, Disp: 84 tablet, Rfl: 3    Allergies - No Known Allergies    Social History -   Social History     Socioeconomic History     Marital status: Single     Spouse name: Not on file     Number of children: Not on file     Years of education: Not on file     Highest education level: Not on file   Occupational History     Not on file   Social Needs     Financial resource strain: Not on file     Food insecurity:     Worry: Not on file     Inability: Not on file     Transportation needs:     Medical: Not on file     Non-medical: Not on file   Tobacco Use     Smoking status: Never Smoker     Smokeless tobacco: Never Used   Substance and Sexual Activity     Alcohol use: No     Drug use: Yes     Types: Marijuana     Sexual activity: Yes     Partners: Male     Birth control/protection: Condom, Pill  "  Lifestyle     Physical activity:     Days per week: Not on file     Minutes per session: Not on file     Stress: Not on file   Relationships     Social connections:     Talks on phone: Not on file     Gets together: Not on file     Attends L[employer] day service: Not on file     Active member of club or organization: Not on file     Attends meetings of clubs or organizations: Not on file     Relationship status: Not on file     Intimate partner violence:     Fear of current or ex partner: Not on file     Emotionally abused: Not on file     Physically abused: Not on file     Forced sexual activity: Not on file   Other Topics Concern     Parent/sibling w/ CABG, MI or angioplasty before 65F 55M? Not Asked   Social History Narrative     Not on file       Family History -   Family History   Problem Relation Age of Onset     Hypertension Father      Diabetes No family hx of      Cerebrovascular Disease No family hx of      Breast Cancer No family hx of      Cancer - colorectal No family hx of      Prostate Cancer No family hx of      Cancer No family hx of      Cardiovascular No family hx of        Review of Systems - As per HPI and PMHx, otherwise 10+ system review of the head and neck, and general constitution is negative.    Physical Exam  /80   Pulse 70   Ht 1.562 m (5' 1.5\")   Wt 58.1 kg (128 lb)   SpO2 100%   BMI 23.79 kg/m      General - The patient is well nourished and well developed, and appears to have good nutritional status.  Alert and oriented to person and place, answers questions and cooperates with examination appropriately.   Head and Face - Normocephalic and atraumatic, with no gross asymmetry noted of the contour of the facial features.  The facial nerve is intact, with strong symmetric movements.  Voice and Breathing - The patient was breathing comfortably without the use of accessory muscles. There was no wheezing, stridor, or stertor.  The patients voice was clear and strong, and had " appropriate pitch and quality.  Ears - The tympanic membranes are normal in appearance, bony landmarks are intact.  No retraction, perforation, or masses.  No fluid or purulence was seen in the external canal or the middle ear. No evidence of infection of the middle ear or external canal, cerumen was normal in appearance.  Eyes - Extraocular movements intact, and the pupils were reactive to light.  Sclera were not icteric or injected, conjunctiva were pink and moist.  Mouth - Examination of the oral cavity showed pink, healthy oral mucosa. No lesions or ulcerations noted.  The tongue was mobile and midline, and the dentition were in good condition.    Throat - The walls of the oropharynx were smooth, pink, moist, symmetric, and had no lesions or ulcerations.  The tonsillar pillars and soft palate were symmetric.  The uvula was midline on elevation.    Neck - Normal midline excursion of the laryngotracheal complex during swallowing.  Full range of motion on passive movement.  Palpation of the occipital, submental, submandibular, internal jugular chain, and supraclavicular nodes did not demonstrate any abnormal lymph nodes or masses.  The carotid pulse was palpable bilaterally.  Palpation of the thyroid was soft and smooth, with no nodules or goiter appreciated.  The trachea was mobile and midline.  Nose - External contour is symmetric, no gross deflection or scars.  Nasal mucosa is pink and moist with no abnormal mucus.  The septum was midline and non-obstructive, turbinates of normal size and position.  No polyps, masses, or purulence noted on examination.      A/P - Lashanda Calle is a 18 year old female  (R22.0) Maxillary sinus mass  (primary encounter diagnosis)    I have discussed with Lashanda that almost certainly they were referring to a mucocele.  I can offer a sinus CT, but to be cost effective, if she can get me a copy of the panorex or dental films, I am willing to look at those and see if they are sufficient  for me to render an opinion or at least get a baseline for what it might be.    She will try to get those to me and I will call her once I have seen them.

## 2019-06-13 DIAGNOSIS — N92.6 IRREGULAR PERIODS: ICD-10-CM

## 2019-06-13 DIAGNOSIS — N94.6 DYSMENORRHEA: ICD-10-CM

## 2019-06-14 RX ORDER — NORGESTIMATE AND ETHINYL ESTRADIOL 0.25-0.035
KIT ORAL
Qty: 84 TABLET | Refills: 3 | Status: SHIPPED | OUTPATIENT
Start: 2019-06-14 | End: 2019-11-20

## 2019-06-14 NOTE — TELEPHONE ENCOUNTER
Prescription approved per [facility] Refill Protocol.  Linette Mueller RN

## 2019-06-14 NOTE — TELEPHONE ENCOUNTER
"Requested Prescriptions   Pending Prescriptions Disp Refills     MONO-LINYAH 0.25-35 MG-MCG tablet [Pharmacy Med Name: MONO-LINYAH 0.25-35 MG-MCG TABS] 84 tablet 3     Sig: TAKE ONE TABLET BY MOUTH EVERY DAY   Last Written Prescription Date:  7/14/18  Last Fill Quantity: 84,  # refills: 3   Last office visit: 10/23/2018 with prescribing provider:     Future Office Visit:        Contraceptives Protocol Passed - 6/13/2019  8:46 PM        Passed - Patient is not a current smoker if age is 35 or older        Passed - Recent (12 mo) or future (30 days) visit within the authorizing provider's specialty     Patient had office visit in the last 12 months or has a visit in the next 30 days with authorizing provider or within the authorizing provider's specialty.  See \"Patient Info\" tab in inbasket, or \"Choose Columns\" in Meds & Orders section of the refill encounter.              Passed - Medication is active on med list        Passed - No active pregnancy on record        Passed - No positive pregnancy test in past 12 months          "

## 2019-10-21 ENCOUNTER — TRANSFERRED RECORDS (OUTPATIENT)
Dept: HEALTH INFORMATION MANAGEMENT | Facility: CLINIC | Age: 19
End: 2019-10-21

## 2019-11-20 ENCOUNTER — OFFICE VISIT (OUTPATIENT)
Dept: FAMILY MEDICINE | Facility: CLINIC | Age: 19
End: 2019-11-20
Payer: COMMERCIAL

## 2019-11-20 VITALS
HEART RATE: 78 BPM | TEMPERATURE: 98.3 F | WEIGHT: 127 LBS | DIASTOLIC BLOOD PRESSURE: 67 MMHG | SYSTOLIC BLOOD PRESSURE: 101 MMHG | OXYGEN SATURATION: 97 % | BODY MASS INDEX: 23.61 KG/M2

## 2019-11-20 DIAGNOSIS — N92.6 IRREGULAR PERIODS: ICD-10-CM

## 2019-11-20 DIAGNOSIS — N94.6 DYSMENORRHEA: ICD-10-CM

## 2019-11-20 DIAGNOSIS — N89.8 VAGINAL ODOR: ICD-10-CM

## 2019-11-20 DIAGNOSIS — F33.1 MAJOR DEPRESSIVE DISORDER, RECURRENT EPISODE, MODERATE (H): ICD-10-CM

## 2019-11-20 DIAGNOSIS — Z12.4 SCREENING FOR MALIGNANT NEOPLASM OF CERVIX: Primary | ICD-10-CM

## 2019-11-20 DIAGNOSIS — Z11.3 SCREEN FOR STD (SEXUALLY TRANSMITTED DISEASE): ICD-10-CM

## 2019-11-20 LAB
SPECIMEN SOURCE: NORMAL
WET PREP SPEC: NORMAL

## 2019-11-20 PROCEDURE — 99214 OFFICE O/P EST MOD 30 MIN: CPT | Performed by: PHYSICIAN ASSISTANT

## 2019-11-20 PROCEDURE — 87210 SMEAR WET MOUNT SALINE/INK: CPT | Performed by: PHYSICIAN ASSISTANT

## 2019-11-20 PROCEDURE — 87491 CHLMYD TRACH DNA AMP PROBE: CPT | Performed by: PHYSICIAN ASSISTANT

## 2019-11-20 PROCEDURE — G0145 SCR C/V CYTO,THINLAYER,RESCR: HCPCS | Performed by: PHYSICIAN ASSISTANT

## 2019-11-20 PROCEDURE — 87591 N.GONORRHOEAE DNA AMP PROB: CPT | Performed by: PHYSICIAN ASSISTANT

## 2019-11-20 RX ORDER — NORGESTIMATE AND ETHINYL ESTRADIOL 0.25-0.035
1 KIT ORAL DAILY
Qty: 84 TABLET | Refills: 3 | Status: SHIPPED | OUTPATIENT
Start: 2019-11-20 | End: 2020-10-28

## 2019-11-20 RX ORDER — TRAZODONE HYDROCHLORIDE 50 MG/1
TABLET, FILM COATED ORAL
Qty: 90 TABLET | Refills: 1 | Status: SHIPPED | OUTPATIENT
Start: 2019-11-20 | End: 2020-02-04 | Stop reason: SINTOL

## 2019-11-20 ASSESSMENT — ANXIETY QUESTIONNAIRES
3. WORRYING TOO MUCH ABOUT DIFFERENT THINGS: NEARLY EVERY DAY
1. FEELING NERVOUS, ANXIOUS, OR ON EDGE: NEARLY EVERY DAY
7. FEELING AFRAID AS IF SOMETHING AWFUL MIGHT HAPPEN: NOT AT ALL
6. BECOMING EASILY ANNOYED OR IRRITABLE: NEARLY EVERY DAY
GAD7 TOTAL SCORE: 15
2. NOT BEING ABLE TO STOP OR CONTROL WORRYING: NEARLY EVERY DAY
IF YOU CHECKED OFF ANY PROBLEMS ON THIS QUESTIONNAIRE, HOW DIFFICULT HAVE THESE PROBLEMS MADE IT FOR YOU TO DO YOUR WORK, TAKE CARE OF THINGS AT HOME, OR GET ALONG WITH OTHER PEOPLE: VERY DIFFICULT
5. BEING SO RESTLESS THAT IT IS HARD TO SIT STILL: SEVERAL DAYS

## 2019-11-20 ASSESSMENT — PATIENT HEALTH QUESTIONNAIRE - PHQ9
5. POOR APPETITE OR OVEREATING: MORE THAN HALF THE DAYS
SUM OF ALL RESPONSES TO PHQ QUESTIONS 1-9: 15

## 2019-11-20 NOTE — LETTER
[facility]   [address] [place]  [address]  [phone]                                   November 22, 2019    Lashanda Calle  [address] [facility] [address]        Dear Lashanda,    Your chlamydia and gonorrhea testing were negative.     Results for orders placed or performed in visit on 11/20/19   Wet prep     Status: None   Result Value Ref Range    Specimen Description Vagina     Wet Prep No Trichomonas seen     Wet Prep No clue cells seen     Wet Prep No yeast seen     Wet Prep Rare  WBC'S seen      Chlamydia trachomatis PCR     Status: None   Result Value Ref Range    Specimen Description Cervix     Chlamydia Trachomatis PCR Negative NEG^Negative   Neisseria gonorrhoeae PCR     Status: None   Result Value Ref Range    Specimen Descrip Cervix     N Gonorrhea PCR Negative NEG^Negative       If you have any questions please call the clinic at [phone]    Sincerely,    Nuria Turner PA-C  [facility]

## 2019-11-20 NOTE — PROGRESS NOTES
Subjective     Lashanda Calle is a 19 year old female who presents to clinic today for the following health issues:    HPI   Depression and Anxiety Follow-Up    How are you doing with your depression since your last visit? Worsened     How are you doing with your anxiety since your last visit?  Worsened     Are you having other symptoms that might be associated with depression or anxiety? No    Have you had a significant life event? No     Do you have any concerns with your use of alcohol or other drugs? No     Doing therapy and it is helping.  Prozac at 20mg does help.  Goes on and then off.      Can't stay asleep or fall asleep.  Melatonin helps sometimes.      Social History     Tobacco Use     Smoking status: Never Smoker     Smokeless tobacco: Never Used   Substance Use Topics     Alcohol use: No     Drug use: Yes     Types: Marijuana     PHQ 4/27/2018 10/23/2018 11/20/2019   PHQ-9 Total Score 5 4 15   Q9: Thoughts of better off dead/self-harm past 2 weeks Not at all Not at all Not at all     THEODORA-7 SCORE 9/12/2017 10/23/2018 11/20/2019   Total Score - - -   Total Score 14 7 15     Last PHQ-9 11/20/2019   1.  Little interest or pleasure in doing things 1   2.  Feeling down, depressed, or hopeless 2   3.  Trouble falling or staying asleep, or sleeping too much 3   4.  Feeling tired or having little energy 3   5.  Poor appetite or overeating 2   6.  Feeling bad about yourself 3   7.  Trouble concentrating 1   8.  Moving slowly or restless 0   Q9: Thoughts of better off dead/self-harm past 2 weeks 0   PHQ-9 Total Score 15   Difficulty at work, home, or with people Somewhat difficult     THEODORA-7  11/20/2019   1. Feeling nervous, anxious, or on edge 3   2. Not being able to stop or control worrying 3   3. Worrying too much about different things 3   4. Trouble relaxing 2   5. Being so restless that it is hard to sit still 1   6. Becoming easily annoyed or irritable 3   7. Feeling afraid, as if something awful might  happen 0   THEODORA-7 Total Score 15   If you checked any problems, how difficult have they made it for you to do your work, take care of things at home, or get along with other people? Very difficult         Suicide Assessment Five-step Evaluation and Treatment (SAFE-T)      How many servings of fruits and vegetables do you eat daily?  1    On average, how many sweetened beverages do you drink each day (soda, juice, sweet tea, etc)?   0    How many days per week do you miss taking your medication? 0    Pap smear today   No sexually transmitted disease concerns.  Does have a vaginal odor.            Patient Active Problem List   Diagnosis     Generalized anxiety disorder     Dysmenorrhea     Major depressive disorder, recurrent episode, moderate (H)     Past Surgical History:   Procedure Laterality Date     HC TOOTH EXTRACTION W/FORCEP  05/2019       Social History     Tobacco Use     Smoking status: Never Smoker     Smokeless tobacco: Never Used   Substance Use Topics     Alcohol use: No     Family History   Problem Relation Age of Onset     Hypertension Father      Diabetes No family hx of      Cerebrovascular Disease No family hx of      Breast Cancer No family hx of      Cancer - colorectal No family hx of      Prostate Cancer No family hx of      Cancer No family hx of      Cardiovascular No family hx of              Reviewed and updated as needed this visit by Provider  Allergies  Meds         Review of Systems   As above      Objective    /67   Pulse 78   Temp 98.3  F (36.8  C) (Oral)   Wt 57.6 kg (127 lb)   LMP 10/30/2019 (Exact Date)   SpO2 97%   BMI 23.61 kg/m    Body mass index is 23.61 kg/m .  Physical Exam  Constitutional:       General: She is not in acute distress.  Genitourinary:     Vagina: Normal.      Cervix: Normal.      Uterus: Normal.       Adnexa: Right adnexa normal and left adnexa normal.   Neurological:      Mental Status: She is alert.   Psychiatric:         Mood and Affect: Mood  normal.         Thought Content: Thought content normal.            Diagnostic Test Results:  Labs reviewed in Epic        Assessment & Plan     1. Major depressive disorder, recurrent episode, moderate (H)  Restart prozac,  Add trazodone for now   - FLUoxetine (PROZAC) 20 MG capsule; Take 1 capsule (20 mg) by mouth daily  Dispense: 90 capsule; Refill: 1  - traZODone (DESYREL) 50 MG tablet; 1-2 at bedtime as needed  Dispense: 90 tablet; Refill: 1    2. Screening for malignant neoplasm of cervix    - Pap imaged thin layer screen only - recommended age 21 - 24 years    3. Irregular periods  refilled  - norgestimate-ethinyl estradiol (MONO-LINYAH) 0.25-35 MG-MCG tablet; Take 1 tablet by mouth daily  Dispense: 84 tablet; Refill: 3    4. Dysmenorrhea  As above  - norgestimate-ethinyl estradiol (MONO-LINYAH) 0.25-35 MG-MCG tablet; Take 1 tablet by mouth daily  Dispense: 84 tablet; Refill: 3    5. Screen for STD (sexually transmitted disease)  Follow up as needed  - Chlamydia trachomatis PCR  - Neisseria gonorrhoeae PCR    6. Vaginal odor    - Wet prep           Return in about 4 weeks (around 12/18/2019) for mood.    Nuria Turner PA-C  [facility]

## 2019-11-20 NOTE — LETTER
November 26, 2019      Lashanda Calle  [address] [place] [id number]    Dear ,      I am happy to inform you that your recent cervical cancer screening test (PAP smear) was normal.      Preventative screenings such as this help to ensure your health for years to come. You should repeat a pap smear in 3 years, unless otherwise directed.      You will still need to return to the clinic every year for your annual exam and other preventive tests.     If you have additional questions regarding this result, please call our registered nurse, Inna at [phone].      Sincerely,      Nuria Turner PA-C/domitila

## 2019-11-21 ASSESSMENT — ANXIETY QUESTIONNAIRES: GAD7 TOTAL SCORE: 15

## 2019-11-26 LAB
COPATH REPORT: NORMAL
PAP: NORMAL

## 2020-02-04 ENCOUNTER — OFFICE VISIT (OUTPATIENT)
Dept: FAMILY MEDICINE | Facility: CLINIC | Age: 20
End: 2020-02-04
Payer: COMMERCIAL

## 2020-02-04 VITALS
HEART RATE: 72 BPM | DIASTOLIC BLOOD PRESSURE: 78 MMHG | TEMPERATURE: 98.3 F | WEIGHT: 127 LBS | BODY MASS INDEX: 23.37 KG/M2 | HEIGHT: 62 IN | SYSTOLIC BLOOD PRESSURE: 121 MMHG

## 2020-02-04 DIAGNOSIS — F33.1 MAJOR DEPRESSIVE DISORDER, RECURRENT EPISODE, MODERATE (H): ICD-10-CM

## 2020-02-04 DIAGNOSIS — D22.9 CHANGE IN COLOR OF SKIN MOLE: Primary | ICD-10-CM

## 2020-02-04 DIAGNOSIS — F41.1 GENERALIZED ANXIETY DISORDER: ICD-10-CM

## 2020-02-04 DIAGNOSIS — Z23 NEED FOR PROPHYLACTIC VACCINATION AND INOCULATION AGAINST INFLUENZA: ICD-10-CM

## 2020-02-04 PROCEDURE — 99214 OFFICE O/P EST MOD 30 MIN: CPT | Mod: 25 | Performed by: PHYSICIAN ASSISTANT

## 2020-02-04 PROCEDURE — 90686 IIV4 VACC NO PRSV 0.5 ML IM: CPT | Performed by: PHYSICIAN ASSISTANT

## 2020-02-04 PROCEDURE — 90471 IMMUNIZATION ADMIN: CPT | Performed by: PHYSICIAN ASSISTANT

## 2020-02-04 ASSESSMENT — ANXIETY QUESTIONNAIRES
2. NOT BEING ABLE TO STOP OR CONTROL WORRYING: SEVERAL DAYS
GAD7 TOTAL SCORE: 4
7. FEELING AFRAID AS IF SOMETHING AWFUL MIGHT HAPPEN: NOT AT ALL
5. BEING SO RESTLESS THAT IT IS HARD TO SIT STILL: NOT AT ALL
IF YOU CHECKED OFF ANY PROBLEMS ON THIS QUESTIONNAIRE, HOW DIFFICULT HAVE THESE PROBLEMS MADE IT FOR YOU TO DO YOUR WORK, TAKE CARE OF THINGS AT HOME, OR GET ALONG WITH OTHER PEOPLE: SOMEWHAT DIFFICULT
6. BECOMING EASILY ANNOYED OR IRRITABLE: SEVERAL DAYS
3. WORRYING TOO MUCH ABOUT DIFFERENT THINGS: SEVERAL DAYS
1. FEELING NERVOUS, ANXIOUS, OR ON EDGE: SEVERAL DAYS

## 2020-02-04 ASSESSMENT — PATIENT HEALTH QUESTIONNAIRE - PHQ9
5. POOR APPETITE OR OVEREATING: NOT AT ALL
SUM OF ALL RESPONSES TO PHQ QUESTIONS 1-9: 7

## 2020-02-04 ASSESSMENT — MIFFLIN-ST. JEOR: SCORE: 1286.38

## 2020-02-04 NOTE — PROGRESS NOTES
Subjective     Lashanda Calle is a 19 year old female who presents to clinic today for the following health issues:    HPI   Depression and Anxiety Follow-Up    How are you doing with your depression since your last visit? Improved     How are you doing with your anxiety since your last visit?  Improved     Are you having other symptoms that might be associated with depression or anxiety? No    Have you had a significant life event? No     Do you have any concerns with your use of alcohol or other drugs? No    Patient states that Prozac is really helping, no depressive symptoms. Has anxiety symptom about once a week but states that these are manageable. No SI. Would like to stop taking Trazodone, she took it for about one week and did not like how it made her feel. She is trying melatonin and good sleep hygiene which seem to be helping.     Started tanning at age 16, and tanned for two years. About 6 months ago she noticed a mole on both her right breast and left breast. She is concerned, because the left mole is still slowly getting larger.    Social History     Tobacco Use     Smoking status: Never Smoker     Smokeless tobacco: Never Used   Substance Use Topics     Alcohol use: No     Drug use: Yes     Types: Marijuana     PHQ 10/23/2018 11/20/2019 2/4/2020   PHQ-9 Total Score 4 15 7   Q9: Thoughts of better off dead/self-harm past 2 weeks Not at all Not at all Not at all     THEODORA-7 SCORE 10/23/2018 11/20/2019 2/4/2020   Total Score - - -   Total Score 7 15 4     Last PHQ-9 2/4/2020   1.  Little interest or pleasure in doing things 1   2.  Feeling down, depressed, or hopeless 1   3.  Trouble falling or staying asleep, or sleeping too much 3   4.  Feeling tired or having little energy 1   5.  Poor appetite or overeating 0   6.  Feeling bad about yourself 0   7.  Trouble concentrating 1   8.  Moving slowly or restless 0   Q9: Thoughts of better off dead/self-harm past 2 weeks 0   PHQ-9 Total Score 7   Difficulty at  "work, home, or with people Somewhat difficult     THEODORA-7  2/4/2020   1. Feeling nervous, anxious, or on edge 1   2. Not being able to stop or control worrying 1   3. Worrying too much about different things 1   4. Trouble relaxing 0   5. Being so restless that it is hard to sit still 0   6. Becoming easily annoyed or irritable 1   7. Feeling afraid, as if something awful might happen 0   THEODORA-7 Total Score 4   If you checked any problems, how difficult have they made it for you to do your work, take care of things at home, or get along with other people? Somewhat difficult         Suicide Assessment Five-step Evaluation and Treatment (SAFE-T)      How many servings of fruits and vegetables do you eat daily?  1-2     On average, how many sweetened beverages do you drink each day (Examples: soda, juice, sweet tea, etc.  Do NOT count diet or artificially sweetened beverages)?   0-1     How many days per week do you exercise enough to make your heart beat faster? 3 or less     How many minutes a day do you exercise enough to make your heart beat faster? 20 - 29    How many days per week do you miss taking your medication? 0    Check 2 moles on R and L breast x 6 months     Current Outpatient Medications   Medication Sig Dispense Refill     FLUoxetine (PROZAC) 20 MG capsule Take 1 capsule (20 mg) by mouth daily 90 capsule 1     norgestimate-ethinyl estradiol (MONO-LINYAH) 0.25-35 MG-MCG tablet Take 1 tablet by mouth daily 84 tablet 3     Reviewed and updated as needed this visit by Provider  Meds         Review of Systems   ROS COMP: Constitutional, HEENT, cardiovascular, pulmonary, gi and gu systems are negative, except as otherwise noted.      Objective    /78   Pulse 72   Temp 98.3  F (36.8  C) (Oral)   Ht 1.562 m (5' 1.5\")   Wt 57.6 kg (127 lb)   BMI 23.61 kg/m    Body mass index is 23.61 kg/m .  Physical Exam  Constitutional:       Appearance: Normal appearance.   HENT:      Head: Normocephalic.   Eyes:    "   Pupils: Pupils are equal, round, and reactive to light.   Chest:      Breasts:         Right: No mass, nipple discharge, skin change or tenderness.         Left: No mass, nipple discharge, skin change or tenderness.       Skin:     General: Skin is warm.   Neurological:      Mental Status: She is alert.   Psychiatric:         Mood and Affect: Mood normal.         Behavior: Behavior normal.             Assessment & Plan     1. Change in color of skin mole  - DERMATOLOGY REFERRAL    2. Generalized anxiety disorder  Anxiety is stable. Will continue current dose of Prozac, follow up in about 6 months.     3. Major depressive disorder, recurrent episode, moderate (H)  Depression is stable. Will continue current dose of Prozac, follow up in about 6 months.       Return in about 6 months (around 8/4/2020) for mood.  IViviana PA-S, saw the above patient in conjunction with Nuria Turner PA-C and served as medical scribe. Please reference provider signgature.   The above student acted as scribe and the encounter documented above was completely performed by myself and the documentation reflects the work I have performed today.    Nuria Turner PA-C  [facility]

## 2020-02-05 ASSESSMENT — ANXIETY QUESTIONNAIRES: GAD7 TOTAL SCORE: 4

## 2020-08-21 NOTE — PROGRESS NOTES
SUBJECTIVE:   Lashanda Calle is a 18 year old female who presents to clinic today for the following health issues:        Vaginal Symptoms  Onset: 5 days     Description:  Vaginal Discharge: white   Itching (Pruritis): YES  Burning sensation:  no   Odor: YES    Accompanying Signs & Symptoms:  Pain with Urination: pressure   Abdominal Pain: no   Fever: no     History:   Sexually active: YES  New Partner: no   Possibility of Pregnancy:  No    Precipitating factors:   Recent Antibiotic Use: no     Alleviating factors:  None     Therapies Tried and outcome: none     Been off prozac x 2 months and anxiety is worse.    She just stopped it because she felt better     Problem list and histories reviewed & adjusted, as indicated.  Additional history: as documented    Patient Active Problem List   Diagnosis     Generalized anxiety disorder     Dysmenorrhea     Major depressive disorder, recurrent episode, moderate (H)     No past surgical history on file.    Social History   Substance Use Topics     Smoking status: Never Smoker     Smokeless tobacco: Never Used     Alcohol use No     Family History   Problem Relation Age of Onset     Hypertension Father      Diabetes No family hx of      Cerebrovascular Disease No family hx of      Breast Cancer No family hx of      Cancer - colorectal No family hx of      Prostate Cancer No family hx of      Cancer No family hx of      Cardiovascular No family hx of            Reviewed and updated as needed this visit by clinical staff  Tobacco  Allergies  Meds       Reviewed and updated as needed this visit by Provider  Allergies  Meds         ROS:  As above    OBJECTIVE:     /69  Pulse 66  Temp 98.4  F (36.9  C) (Oral)  Wt 125 lb (56.7 kg)  LMP 09/23/2018  SpO2 98%  BMI 23.24 kg/m2  Body mass index is 23.24 kg/(m^2).  GENERAL: healthy, alert and no distress  PSYCH: mentation appears normal, affect normal/bright    Diagnostic Test Results:  Results for orders placed or  performed in visit on 10/23/18 (from the past 24 hour(s))   Wet prep   Result Value Ref Range    Specimen Description Vagina     Wet Prep No Trichomonas seen     Wet Prep No clue cells seen     Wet Prep Yeast seen (A)        ASSESSMENT/PLAN:       1. Vaginal discharge    - Wet prep    2. Major depressive disorder, recurrent episode, moderate (H)  Restart.    - DEPRESSION ACTION PLAN (DAP)  - FLUoxetine (PROZAC) 20 MG capsule; Take 1 capsule (20 mg) by mouth daily  Dispense: 30 capsule; Refill: 1    3. Generalized anxiety disorder  As above  - FLUoxetine (PROZAC) 20 MG capsule; Take 1 capsule (20 mg) by mouth daily  Dispense: 30 capsule; Refill: 1    4. Yeast infection of the vagina    - fluconazole (DIFLUCAN) 150 MG tablet; Take 1 tablet (150 mg) by mouth once for 1 dose  Dispense: 1 tablet; Refill: 0    5. Screening examination for venereal disease  Follow up as needed  - NEISSERIA GONORRHOEA PCR  - CHLAMYDIA TRACHOMATIS PCR    FUTURE APPOINTMENTS:       - Follow-up office or E-visit in 4 weeks or telephone visit     Nuria Turner PA-C  [facility]   I have reviewed and confirmed nurses' notes...

## 2020-09-28 ENCOUNTER — TRANSFERRED RECORDS (OUTPATIENT)
Dept: HEALTH INFORMATION MANAGEMENT | Facility: CLINIC | Age: 20
End: 2020-09-28

## 2020-09-28 ENCOUNTER — OFFICE VISIT (OUTPATIENT)
Dept: FAMILY MEDICINE | Facility: CLINIC | Age: 20
End: 2020-09-28
Payer: COMMERCIAL

## 2020-09-28 VITALS
TEMPERATURE: 99.3 F | WEIGHT: 140 LBS | SYSTOLIC BLOOD PRESSURE: 120 MMHG | HEART RATE: 92 BPM | OXYGEN SATURATION: 97 % | BODY MASS INDEX: 26.02 KG/M2 | DIASTOLIC BLOOD PRESSURE: 84 MMHG

## 2020-09-28 DIAGNOSIS — R10.9 RIGHT FLANK PAIN: ICD-10-CM

## 2020-09-28 DIAGNOSIS — R30.0 DYSURIA: ICD-10-CM

## 2020-09-28 DIAGNOSIS — R10.31 RLQ ABDOMINAL PAIN: Primary | ICD-10-CM

## 2020-09-28 LAB
ALBUMIN UR-MCNC: NEGATIVE MG/DL
APPEARANCE UR: CLEAR
BILIRUB UR QL STRIP: NEGATIVE
COLOR UR AUTO: YELLOW
ERYTHROCYTE [DISTWIDTH] IN BLOOD BY AUTOMATED COUNT: 11.9 % (ref 10–15)
GLUCOSE UR STRIP-MCNC: NEGATIVE MG/DL
HCT VFR BLD AUTO: 40.3 % (ref 35–47)
HGB BLD-MCNC: 13.7 G/DL (ref 11.7–15.7)
HGB UR QL STRIP: NEGATIVE
KETONES UR STRIP-MCNC: NEGATIVE MG/DL
LEUKOCYTE ESTERASE UR QL STRIP: NEGATIVE
MCH RBC QN AUTO: 32 PG (ref 26.5–33)
MCHC RBC AUTO-ENTMCNC: 34 G/DL (ref 31.5–36.5)
MCV RBC AUTO: 94 FL (ref 78–100)
NITRATE UR QL: NEGATIVE
PH UR STRIP: 6 PH (ref 5–7)
PLATELET # BLD AUTO: 291 10E9/L (ref 150–450)
RBC # BLD AUTO: 4.28 10E12/L (ref 3.8–5.2)
SOURCE: NORMAL
SP GR UR STRIP: 1.02 (ref 1–1.03)
UROBILINOGEN UR STRIP-ACNC: 0.2 EU/DL (ref 0.2–1)
WBC # BLD AUTO: 7.8 10E9/L (ref 4–11)

## 2020-09-28 PROCEDURE — 99214 OFFICE O/P EST MOD 30 MIN: CPT | Performed by: PHYSICIAN ASSISTANT

## 2020-09-28 PROCEDURE — 81003 URINALYSIS AUTO W/O SCOPE: CPT | Performed by: PHYSICIAN ASSISTANT

## 2020-09-28 PROCEDURE — 36415 COLL VENOUS BLD VENIPUNCTURE: CPT | Performed by: PHYSICIAN ASSISTANT

## 2020-09-28 PROCEDURE — 85027 COMPLETE CBC AUTOMATED: CPT | Performed by: PHYSICIAN ASSISTANT

## 2020-09-28 NOTE — PROGRESS NOTES
Subjective     Lashanda Calle is a 20 year old female who presents to clinic today for the following health issues:    HPI       Genitourinary - Female  Onset/Duration:Yesterday  Description:   Painful urination (Dysuria): YES           Frequency: YES  Blood in urine (Hematuria): no  Delay in urine (Hesitency): YES  Intensity: severe  Progression of Symptoms:  same  Accompanying Signs & Symptoms:  Fever/chills: no  Flank pain: YES  Nausea and vomiting: no  Vaginal symptoms: odor  Abdominal/Pelvic Pain: YES  History:   History of frequent UTI s: YES  History of kidney stones: YES  Sexually Active: YES  Possibility of pregnancy: no since she is on birth control  Precipitating or alleviating factors: None  Therapies tried and outcome:  None     Patient is here to discuss RLQ pain which started yesterday. It also radiates to her right flank. Has had some diarrhea since yesterday. Vomited yesterday and this morning. Has been nauseous. No fever. No chills. No pain with urination. Sort of feels pressure. Dysuria.    One sexual partner in the last year.   Had blood in the urine with previous kidney stone - at age 17. She does not report blood in her urine with the current symptoms.     Gets periods on OCP. Due next week for period. No history of ovarian cysts.     Review of Systems   Constitutional, HEENT, cardiovascular, pulmonary, gi and gu systems are negative, except as otherwise noted.      Objective    /84 (BP Location: Left arm, Patient Position: Chair, Cuff Size: Adult Regular)   Pulse 92   Temp 99.3  F (37.4  C) (Oral)   Wt 63.5 kg (140 lb)   LMP 08/30/2020 (Approximate)   SpO2 97%   BMI 26.02 kg/m    Body mass index is 26.02 kg/m .  Physical Exam   GENERAL: healthy, alert and no distress  NECK: no adenopathy, no asymmetry, masses, or scars and thyroid normal to palpation  RESP: lungs clear to auscultation - no rales, rhonchi or wheezes  CV: regular rate and rhythm, normal S1 S2, no S3 or S4, no murmur,  click or rub, no peripheral edema and peripheral pulses strong  ABDOMEN: soft, no hepatosplenomegaly, no masses and bowel sounds normal. RLQ acutely tender. Right CVA tenderness.  MS: no gross musculoskeletal defects noted, no edema        Lab Results   Component Value Date    WBC 7.8 09/28/2020     Lab Results   Component Value Date    RBC 4.28 09/28/2020     Lab Results   Component Value Date    HGB 13.7 09/28/2020     Lab Results   Component Value Date    HCT 40.3 09/28/2020     No components found for: MCT  Lab Results   Component Value Date    MCV 94 09/28/2020     Lab Results   Component Value Date    MCH 32.0 09/28/2020     Lab Results   Component Value Date    MCHC 34.0 09/28/2020     Lab Results   Component Value Date    RDW 11.9 09/28/2020     Lab Results   Component Value Date     09/28/2020         Assessment & Plan     RLQ abdominal pain  Right flank pain  Dysuria  Urinalysis and CBC normal. Patient reports severe pain. Difficulty sitting comfortably. Because of severe pain, recommend patient be seen in ED to expedite imaging and rule out severe causes of pain. Did discuss with patient that she has had a few episodes of severe similar pain in the past which have resulted in normal studies - this could very well result in normal imaging again. If that is the case, I recommended she return to clinic for follow up to discuss further studies to evaluate for underlying causes of similar episodes of pain. She agrees and plans to go to [facility] ED this afternoon. She reports she is safe to travel on her own.   - UA reflex to Microscopic and Culture  - CBC with platelets      Return if symptoms worsen or fail to improve.    Jade Jeter PA-C  [facility]

## 2020-10-26 DIAGNOSIS — N94.6 DYSMENORRHEA: ICD-10-CM

## 2020-10-26 DIAGNOSIS — N92.6 IRREGULAR PERIODS: ICD-10-CM

## 2020-10-28 RX ORDER — NORGESTIMATE AND ETHINYL ESTRADIOL 0.25-0.035
KIT ORAL
Qty: 84 TABLET | Refills: 2 | Status: SHIPPED | OUTPATIENT
Start: 2020-10-28 | End: 2021-03-10

## 2020-10-28 NOTE — TELEPHONE ENCOUNTER
Prescription approved per [facility] Refill Protocol.      Ita Simmons RN, BSN, PHN

## 2021-01-19 DIAGNOSIS — F33.1 MAJOR DEPRESSIVE DISORDER, RECURRENT EPISODE, MODERATE (H): ICD-10-CM

## 2021-01-19 NOTE — LETTER
January 28, 2021        Lashanda Calle  [address] [place] [id number]                Dear Lashanda,       Your provider has sent a 30 day ramesh refill of FLUoxetine (PROZAC) 20 MG capsule. You are due for an appointment for further refills. Appointment options could include: an in person office visit, telephone visit or Evisit through [employer]t. Please contact the clinic to schedule an appointment for further refills.     Sincerely,     Your Care Team/sg

## 2021-01-26 ASSESSMENT — PATIENT HEALTH QUESTIONNAIRE - PHQ9: SUM OF ALL RESPONSES TO PHQ QUESTIONS 1-9: 13

## 2021-01-26 NOTE — TELEPHONE ENCOUNTER
One month sent.  I have not seen pt in a year and her score is worse.  Needs a visit -can be virtual.    Nuria Turner PA-C

## 2021-01-26 NOTE — TELEPHONE ENCOUNTER
Patient returned call and completed PHQ9. Please advise.  PHQ 1/26/2021   PHQ-9 Total Score 13   Q9: Thoughts of better off dead/self-harm past 2 weeks Not at all      Nelsy Bahena MA

## 2021-03-10 ENCOUNTER — VIRTUAL VISIT (OUTPATIENT)
Dept: FAMILY MEDICINE | Facility: CLINIC | Age: 21
End: 2021-03-10
Payer: COMMERCIAL

## 2021-03-10 DIAGNOSIS — F41.1 GENERALIZED ANXIETY DISORDER: Primary | ICD-10-CM

## 2021-03-10 DIAGNOSIS — N92.6 IRREGULAR PERIODS: ICD-10-CM

## 2021-03-10 DIAGNOSIS — N94.6 DYSMENORRHEA: ICD-10-CM

## 2021-03-10 DIAGNOSIS — F33.1 MAJOR DEPRESSIVE DISORDER, RECURRENT EPISODE, MODERATE (H): ICD-10-CM

## 2021-03-10 PROCEDURE — 99214 OFFICE O/P EST MOD 30 MIN: CPT | Mod: 95 | Performed by: PHYSICIAN ASSISTANT

## 2021-03-10 PROCEDURE — 96127 BRIEF EMOTIONAL/BEHAV ASSMT: CPT | Performed by: PHYSICIAN ASSISTANT

## 2021-03-10 RX ORDER — HYDROXYZINE HYDROCHLORIDE 25 MG/1
25 TABLET, FILM COATED ORAL
Qty: 90 TABLET | Refills: 1 | Status: SHIPPED | OUTPATIENT
Start: 2021-03-10 | End: 2022-09-02

## 2021-03-10 RX ORDER — BUPROPION HYDROCHLORIDE 150 MG/1
150 TABLET ORAL EVERY MORNING
Qty: 30 TABLET | Refills: 1 | Status: SHIPPED | OUTPATIENT
Start: 2021-03-10 | End: 2021-06-01

## 2021-03-10 RX ORDER — NORGESTIMATE AND ETHINYL ESTRADIOL 0.25-0.035
1 KIT ORAL DAILY
Qty: 84 TABLET | Refills: 2 | Status: SHIPPED | OUTPATIENT
Start: 2021-03-10 | End: 2021-06-01

## 2021-03-10 ASSESSMENT — PATIENT HEALTH QUESTIONNAIRE - PHQ9
5. POOR APPETITE OR OVEREATING: SEVERAL DAYS
SUM OF ALL RESPONSES TO PHQ QUESTIONS 1-9: 10

## 2021-03-10 ASSESSMENT — ANXIETY QUESTIONNAIRES
IF YOU CHECKED OFF ANY PROBLEMS ON THIS QUESTIONNAIRE, HOW DIFFICULT HAVE THESE PROBLEMS MADE IT FOR YOU TO DO YOUR WORK, TAKE CARE OF THINGS AT HOME, OR GET ALONG WITH OTHER PEOPLE: SOMEWHAT DIFFICULT
2. NOT BEING ABLE TO STOP OR CONTROL WORRYING: SEVERAL DAYS
3. WORRYING TOO MUCH ABOUT DIFFERENT THINGS: SEVERAL DAYS
1. FEELING NERVOUS, ANXIOUS, OR ON EDGE: SEVERAL DAYS
7. FEELING AFRAID AS IF SOMETHING AWFUL MIGHT HAPPEN: NOT AT ALL
5. BEING SO RESTLESS THAT IT IS HARD TO SIT STILL: NOT AT ALL
GAD7 TOTAL SCORE: 5
6. BECOMING EASILY ANNOYED OR IRRITABLE: SEVERAL DAYS

## 2021-03-10 NOTE — PROGRESS NOTES
Lashanda is a 20 year old who is being evaluated via a billable telephone visit.    12:20-12;27  What phone number would you like to be contacted at? [phone]   How would you like to obtain your AVS? Mail a copy    Assessment & Plan     Generalized anxiety disorder  Try wellbutrin instead due to fatigue.  Add hydroxyzine for sleep  - buPROPion (WELLBUTRIN XL) 150 MG 24 hr tablet; Take 1 tablet (150 mg) by mouth every morning  - hydrOXYzine (ATARAX) 25 MG tablet; Take 1 tablet (25 mg) by mouth nightly as needed for anxiety    Major depressive disorder, recurrent episode, moderate (H)  As above  - buPROPion (WELLBUTRIN XL) 150 MG 24 hr tablet; Take 1 tablet (150 mg) by mouth every morning  - hydrOXYzine (ATARAX) 25 MG tablet; Take 1 tablet (25 mg) by mouth nightly as needed for anxiety    Irregular periods  refilled  - norgestimate-ethinyl estradiol (MONO-LINYAH) 0.25-35 MG-MCG tablet; Take 1 tablet by mouth daily    Dysmenorrhea  refilled  - norgestimate-ethinyl estradiol (MONO-LINYAH) 0.25-35 MG-MCG tablet; Take 1 tablet by mouth daily                 Return in about 4 weeks (around 4/7/2021) for mood.    MADI Alcocer [facility]   Lashanda is a 20 year old who presents for the following health issues     HPI       Depression and Anxiety Follow-Up    How are you doing with your depression since your last visit? Improved     How are you doing with your anxiety since your last visit?  Improved     Are you having other symptoms that might be associated with depression or anxiety? No    Have you had a significant life event? No     Do you have any concerns with your use of alcohol or other drugs? No  Sleeping ok-does wake up a few times a night.  Still feeling very tired.  Started with prozac.    Anxiety is better.  Melatonin isn't working well.  Trazodone didn't help         Social History     Tobacco Use     Smoking status: Never Smoker     Smokeless tobacco: Never  Used   Substance Use Topics     Alcohol use: No     Drug use: Yes     Types: Marijuana     PHQ 2/4/2020 1/26/2021 3/10/2021   PHQ-9 Total Score 7 13 10   Q9: Thoughts of better off dead/self-harm past 2 weeks Not at all Not at all Not at all     THEODORA-7 SCORE 11/20/2019 2/4/2020 3/10/2021   Total Score - - -   Total Score 15 4 5     Last PHQ-9 3/10/2021   1.  Little interest or pleasure in doing things 0   2.  Feeling down, depressed, or hopeless 1   3.  Trouble falling or staying asleep, or sleeping too much 3   4.  Feeling tired or having little energy 3   5.  Poor appetite or overeating 1   6.  Feeling bad about yourself 1   7.  Trouble concentrating 1   8.  Moving slowly or restless 0   Q9: Thoughts of better off dead/self-harm past 2 weeks 0   PHQ-9 Total Score 10   Difficulty at work, home, or with people Somewhat difficult     THEODORA-7  3/10/2021   1. Feeling nervous, anxious, or on edge 1   2. Not being able to stop or control worrying 1   3. Worrying too much about different things 1   4. Trouble relaxing 1   5. Being so restless that it is hard to sit still 0   6. Becoming easily annoyed or irritable 1   7. Feeling afraid, as if something awful might happen 0   THEODORA-7 Total Score 5   If you checked any problems, how difficult have they made it for you to do your work, take care of things at home, or get along with other people? Somewhat difficult       Suicide Assessment Five-step Evaluation and Treatment (SAFE-T)      How many servings of fruits and vegetables do you eat daily?  2-3    On average, how many sweetened beverages do you drink each day (Examples: soda, juice, sweet tea, etc.  Do NOT count diet or artificially sweetened beverages)?   0    How many days per week do you exercise enough to make your heart beat faster? 3 or less    How many minutes a day do you exercise enough to make your heart beat faster? 20 - 29    How many days per week do you miss taking your medication? 0        Review of Systems    As above      Objective           Vitals:  No vitals were obtained today due to virtual visit.    Physical Exam   healthy, alert and no distress  PSYCH: Alert and oriented times 3; coherent speech, normal   rate and volume, able to articulate logical thoughts, able   to abstract reason, no tangential thoughts, no hallucinations   or delusions  Her affect is normal  RESP: No cough, no audible wheezing, able to talk in full sentences  Remainder of exam unable to be completed due to telephone visits                Phone call duration: 7 minutes

## 2021-03-10 NOTE — PATIENT INSTRUCTIONS
Start Wellbutrin for mood  Ok to just stop Prozac.   Try hydroxyzine for sleep, also decrease water intake after dinner

## 2021-03-11 ASSESSMENT — ANXIETY QUESTIONNAIRES: GAD7 TOTAL SCORE: 5

## 2021-03-25 DIAGNOSIS — F33.1 MAJOR DEPRESSIVE DISORDER, RECURRENT EPISODE, MODERATE (H): ICD-10-CM

## 2021-04-01 NOTE — TELEPHONE ENCOUNTER
MA/Team Purple-- Please call patient to ask if she stopped the Prozac, this might just be an automated refill request from pharmacy. She was to schedule f/u in 4 weeks from last visit 03/10.    Per OV notes with Yue 03/10:  Start Wellbutrin for mood  Ok to just stop Prozac.   Try hydroxyzine for sleep, also decrease water intake after dinner

## 2021-04-01 NOTE — TELEPHONE ENCOUNTER
Called patient and left VM to call clinic in regards to below message.   Cori BEATTY CMA ([facility])

## 2021-04-05 NOTE — TELEPHONE ENCOUNTER
Attempt 2, Called patient and left VM to call clinic in regards to below message.   Cori BEATTY CMA ([facility])

## 2021-05-03 ENCOUNTER — IMMUNIZATION (OUTPATIENT)
Dept: NURSING | Facility: CLINIC | Age: 21
End: 2021-05-03
Payer: COMMERCIAL

## 2021-05-03 PROCEDURE — 0001A PR COVID VAC PFIZER DIL RECON 30 MCG/0.3 ML IM: CPT

## 2021-05-03 PROCEDURE — 91300 PR COVID VAC PFIZER DIL RECON 30 MCG/0.3 ML IM: CPT

## 2021-05-04 ENCOUNTER — MYC MEDICAL ADVICE (OUTPATIENT)
Dept: FAMILY MEDICINE | Facility: CLINIC | Age: 21
End: 2021-05-04

## 2021-05-04 NOTE — LETTER
May 5, 2021      Lashanda Calle  [address] [place] [address]        To Whom It May Concern,      Lashanda's parents moved to [place] in 2017.            Sincerely,        Nuria Turner PA-C

## 2021-05-24 ENCOUNTER — IMMUNIZATION (OUTPATIENT)
Dept: NURSING | Facility: CLINIC | Age: 21
End: 2021-05-24
Attending: INTERNAL MEDICINE
Payer: COMMERCIAL

## 2021-05-24 PROCEDURE — 91300 PR COVID VAC PFIZER DIL RECON 30 MCG/0.3 ML IM: CPT

## 2021-05-24 PROCEDURE — 0002A PR COVID VAC PFIZER DIL RECON 30 MCG/0.3 ML IM: CPT

## 2021-06-01 ENCOUNTER — VIRTUAL VISIT (OUTPATIENT)
Dept: FAMILY MEDICINE | Facility: CLINIC | Age: 21
End: 2021-06-01
Payer: COMMERCIAL

## 2021-06-01 DIAGNOSIS — N94.6 DYSMENORRHEA: ICD-10-CM

## 2021-06-01 DIAGNOSIS — N92.6 IRREGULAR PERIODS: ICD-10-CM

## 2021-06-01 DIAGNOSIS — F33.1 MAJOR DEPRESSIVE DISORDER, RECURRENT EPISODE, MODERATE (H): ICD-10-CM

## 2021-06-01 DIAGNOSIS — F41.1 GENERALIZED ANXIETY DISORDER: ICD-10-CM

## 2021-06-01 PROCEDURE — 96127 BRIEF EMOTIONAL/BEHAV ASSMT: CPT | Mod: 95 | Performed by: PHYSICIAN ASSISTANT

## 2021-06-01 PROCEDURE — 99214 OFFICE O/P EST MOD 30 MIN: CPT | Mod: 95 | Performed by: PHYSICIAN ASSISTANT

## 2021-06-01 RX ORDER — NORGESTIMATE AND ETHINYL ESTRADIOL 0.25-0.035
1 KIT ORAL DAILY
Qty: 84 TABLET | Refills: 2 | Status: SHIPPED | OUTPATIENT
Start: 2021-06-01 | End: 2021-12-16

## 2021-06-01 RX ORDER — BUPROPION HYDROCHLORIDE 150 MG/1
150 TABLET ORAL EVERY MORNING
Qty: 30 TABLET | Refills: 1 | Status: SHIPPED | OUTPATIENT
Start: 2021-06-01 | End: 2021-09-29

## 2021-06-01 RX ORDER — ESCITALOPRAM OXALATE 10 MG/1
10 TABLET ORAL DAILY
Qty: 30 TABLET | Refills: 1 | Status: SHIPPED | OUTPATIENT
Start: 2021-06-01 | End: 2021-08-06

## 2021-06-01 ASSESSMENT — ANXIETY QUESTIONNAIRES
2. NOT BEING ABLE TO STOP OR CONTROL WORRYING: MORE THAN HALF THE DAYS
1. FEELING NERVOUS, ANXIOUS, OR ON EDGE: SEVERAL DAYS
7. FEELING AFRAID AS IF SOMETHING AWFUL MIGHT HAPPEN: NOT AT ALL
3. WORRYING TOO MUCH ABOUT DIFFERENT THINGS: MORE THAN HALF THE DAYS
GAD7 TOTAL SCORE: 7
IF YOU CHECKED OFF ANY PROBLEMS ON THIS QUESTIONNAIRE, HOW DIFFICULT HAVE THESE PROBLEMS MADE IT FOR YOU TO DO YOUR WORK, TAKE CARE OF THINGS AT HOME, OR GET ALONG WITH OTHER PEOPLE: SOMEWHAT DIFFICULT
6. BECOMING EASILY ANNOYED OR IRRITABLE: SEVERAL DAYS
5. BEING SO RESTLESS THAT IT IS HARD TO SIT STILL: NOT AT ALL

## 2021-06-01 ASSESSMENT — PATIENT HEALTH QUESTIONNAIRE - PHQ9
SUM OF ALL RESPONSES TO PHQ QUESTIONS 1-9: 9
5. POOR APPETITE OR OVEREATING: SEVERAL DAYS

## 2021-06-01 NOTE — PROGRESS NOTES
Lashanda is a 20 year old who is being evaluated via a billable telephone visit.    2:18-2:24  What phone number would you like to be contacted at? [phone]   How would you like to obtain your AVS? MyChart    Assessment & Plan     Generalized anxiety disorder  Add back selective serotonin reuptake inhibitor since they seemed to work better for mood.  Continue wellbutrin for now with plan to stop once lexapro start working.    - escitalopram (LEXAPRO) 10 MG tablet; Take 1 tablet (10 mg) by mouth daily  - buPROPion (WELLBUTRIN XL) 150 MG 24 hr tablet; Take 1 tablet (150 mg) by mouth every morning    Major depressive disorder, recurrent episode, moderate (H)  As above  - escitalopram (LEXAPRO) 10 MG tablet; Take 1 tablet (10 mg) by mouth daily  - buPROPion (WELLBUTRIN XL) 150 MG 24 hr tablet; Take 1 tablet (150 mg) by mouth every morning    Irregular periods  refilled  - norgestimate-ethinyl estradiol (MONO-LINYAH) 0.25-35 MG-MCG tablet; Take 1 tablet by mouth daily    Dysmenorrhea  As above  - norgestimate-ethinyl estradiol (MONO-LINYAH) 0.25-35 MG-MCG tablet; Take 1 tablet by mouth daily                 Return in about 4 weeks (around 6/29/2021) for mood-virtual is ok .    MADI Alcocer [facility]   Lashanda is a 20 year old who presents for the following health issues     HPI     Depression and Anxiety Follow-Up    How are you doing with your depression since your last visit? No change    How are you doing with your anxiety since your last visit?  No change    Are you having other symptoms that might be associated with depression or anxiety? No    Have you had a significant life event? No     Do you have any concerns with your use of alcohol or other drugs? No     Hydroxyzine is helping.    wellbutrin not helping.  Mood swings are worse now.  feeling more annoyed.  Is under more stress.      Has tried celexa and prozac.  prozac caused headaches.  Seeing therapist.       Social History     Tobacco Use     Smoking status: Never Smoker     Smokeless tobacco: Never Used   Substance Use Topics     Alcohol use: No     Drug use: Yes     Types: Marijuana     PHQ 1/26/2021 3/10/2021 6/1/2021   PHQ-9 Total Score 13 10 9   Q9: Thoughts of better off dead/self-harm past 2 weeks Not at all Not at all Not at all     THEODORA-7 SCORE 2/4/2020 3/10/2021 6/1/2021   Total Score - - -   Total Score 4 5 7     Last PHQ-9 6/1/2021   1.  Little interest or pleasure in doing things 1   2.  Feeling down, depressed, or hopeless 1   3.  Trouble falling or staying asleep, or sleeping too much 1   4.  Feeling tired or having little energy 2   5.  Poor appetite or overeating 1   6.  Feeling bad about yourself 2   7.  Trouble concentrating 1   8.  Moving slowly or restless 0   Q9: Thoughts of better off dead/self-harm past 2 weeks 0   PHQ-9 Total Score 9   Difficulty at work, home, or with people Somewhat difficult     THEODORA-7  6/1/2021   1. Feeling nervous, anxious, or on edge 1   2. Not being able to stop or control worrying 2   3. Worrying too much about different things 2   4. Trouble relaxing 1   5. Being so restless that it is hard to sit still 0   6. Becoming easily annoyed or irritable 1   7. Feeling afraid, as if something awful might happen 0   THEODORA-7 Total Score 7   If you checked any problems, how difficult have they made it for you to do your work, take care of things at home, or get along with other people? Somewhat difficult       Suicide Assessment Five-step Evaluation and Treatment (SAFE-T)      How many servings of fruits and vegetables do you eat daily?  2-3    On average, how many sweetened beverages do you drink each day (Examples: soda, juice, sweet tea, etc.  Do NOT count diet or artificially sweetened beverages)?   0-1    How many days per week do you exercise enough to make your heart beat faster? 3 or less    How many minutes a day do you exercise enough to make your heart beat faster? 20 -  29    How many days per week do you miss taking your medication? 0        Review of Systems   As above      Objective           Vitals:  No vitals were obtained today due to virtual visit.    Physical Exam   healthy, alert and no distress  PSYCH: Alert and oriented times 3; coherent speech, normal   rate and volume, able to articulate logical thoughts, able   to abstract reason, no tangential thoughts, no hallucinations   or delusions  Her affect is normal  RESP: No cough, no audible wheezing, able to talk in full sentences  Remainder of exam unable to be completed due to telephone visits                Phone call duration: 6 minutes

## 2021-06-01 NOTE — PATIENT INSTRUCTIONS
Continue bupropion for 4 weeks or so until the escitalopram starts to work.  Call with any side effects

## 2021-06-02 ASSESSMENT — ANXIETY QUESTIONNAIRES: GAD7 TOTAL SCORE: 7

## 2021-06-06 ENCOUNTER — HEALTH MAINTENANCE LETTER (OUTPATIENT)
Age: 21
End: 2021-06-06

## 2021-09-26 ENCOUNTER — HEALTH MAINTENANCE LETTER (OUTPATIENT)
Age: 21
End: 2021-09-26

## 2021-09-27 DIAGNOSIS — F41.1 GENERALIZED ANXIETY DISORDER: ICD-10-CM

## 2021-09-27 DIAGNOSIS — F33.1 MAJOR DEPRESSIVE DISORDER, RECURRENT EPISODE, MODERATE (H): ICD-10-CM

## 2021-09-29 RX ORDER — BUPROPION HYDROCHLORIDE 150 MG/1
TABLET ORAL
Qty: 90 TABLET | Refills: 0 | Status: SHIPPED | OUTPATIENT
Start: 2021-09-29 | End: 2022-02-04

## 2021-09-29 NOTE — TELEPHONE ENCOUNTER
Prescription approved per [facility] Refill Protocol.    Lexis Richmond RN

## 2021-12-05 DIAGNOSIS — F33.1 MAJOR DEPRESSIVE DISORDER, RECURRENT EPISODE, MODERATE (H): ICD-10-CM

## 2021-12-05 DIAGNOSIS — F41.1 GENERALIZED ANXIETY DISORDER: ICD-10-CM

## 2021-12-07 RX ORDER — ESCITALOPRAM OXALATE 10 MG/1
TABLET ORAL
Qty: 90 TABLET | Refills: 0 | Status: SHIPPED | OUTPATIENT
Start: 2021-12-07 | End: 2022-03-18

## 2021-12-14 DIAGNOSIS — N94.6 DYSMENORRHEA: ICD-10-CM

## 2021-12-14 DIAGNOSIS — N92.6 IRREGULAR PERIODS: ICD-10-CM

## 2021-12-16 RX ORDER — NORGESTIMATE AND ETHINYL ESTRADIOL 0.25-0.035
KIT ORAL
Qty: 84 TABLET | Refills: 1 | Status: SHIPPED | OUTPATIENT
Start: 2021-12-16 | End: 2022-08-05

## 2021-12-16 NOTE — TELEPHONE ENCOUNTER
"Prescription approved per [facility] Refill Protocol.  Requested Prescriptions   Pending Prescriptions Disp Refills     ESTARYLLA 0.25-35 MG-MCG tablet [Pharmacy Med Name: ESTARYLLA TABLETS 28S] 84 tablet 2     Sig: TAKE 1 TABLET BY MOUTH EVERY DAY       Contraceptives Protocol Passed - 12/14/2021  3:27 PM        Passed - Patient is not a current smoker if age is 35 or older        Passed - Recent (12 mo) or future (30 days) visit within the authorizing provider's specialty     Patient has had an office visit with the authorizing provider or a provider within the authorizing providers department within the previous 12 mos or has a future within next 30 days. See \"Patient Info\" tab in inbasket, or \"Choose Columns\" in Meds & Orders section of the refill encounter.              Passed - Medication is active on med list        Passed - No active pregnancy on record        Passed - No positive pregnancy test in past 12 months           Viviana Graf RN on 12/16/2021 at 11:35 AM    "

## 2021-12-24 ENCOUNTER — E-VISIT (OUTPATIENT)
Dept: FAMILY MEDICINE | Facility: CLINIC | Age: 21
End: 2021-12-24
Payer: COMMERCIAL

## 2021-12-24 DIAGNOSIS — N89.8 VAGINAL DISCHARGE: Primary | ICD-10-CM

## 2021-12-24 PROCEDURE — 99422 OL DIG E/M SVC 11-20 MIN: CPT | Performed by: INTERNAL MEDICINE

## 2021-12-27 NOTE — PATIENT INSTRUCTIONS
"  Lashanda,     I am covering for Nuria Turner today.     I would like you to schedule labs -- this will be for urine tests (one is for \"dirty urine\", one for \"clean catch urine\" and the other is a self swab, vaginal)    The results will help us determine what the reason is for your symptoms and hopefully we can treat appropriately once the results are in.     Best,       NIURKA CHURCH M.D.   "

## 2022-03-17 DIAGNOSIS — F33.1 MAJOR DEPRESSIVE DISORDER, RECURRENT EPISODE, MODERATE (H): ICD-10-CM

## 2022-03-17 DIAGNOSIS — F41.1 GENERALIZED ANXIETY DISORDER: ICD-10-CM

## 2022-03-18 ENCOUNTER — LAB REQUISITION (OUTPATIENT)
Dept: LAB | Facility: CLINIC | Age: 22
End: 2022-03-18

## 2022-03-18 LAB
HBV SURFACE AB SERPL IA-ACNC: 0 M[IU]/ML
MEV IGG SER IA-ACNC: 99.3 AU/ML
MEV IGG SER IA-ACNC: POSITIVE
MUMPS ANTIBODY IGG INSTRUMENT VALUE: 155 AU/ML
MUV IGG SER QL IA: POSITIVE
RUBV IGG SERPL QL IA: 2.39 INDEX
RUBV IGG SERPL QL IA: POSITIVE
VZV IGG SER QL IA: 2485 INDEX
VZV IGG SER QL IA: POSITIVE

## 2022-03-18 PROCEDURE — 86706 HEP B SURFACE ANTIBODY: CPT | Performed by: INTERNAL MEDICINE

## 2022-03-18 PROCEDURE — 86762 RUBELLA ANTIBODY: CPT | Performed by: INTERNAL MEDICINE

## 2022-03-18 PROCEDURE — 86787 VARICELLA-ZOSTER ANTIBODY: CPT | Performed by: INTERNAL MEDICINE

## 2022-03-18 PROCEDURE — 86765 RUBEOLA ANTIBODY: CPT | Performed by: INTERNAL MEDICINE

## 2022-03-18 PROCEDURE — 86735 MUMPS ANTIBODY: CPT | Performed by: INTERNAL MEDICINE

## 2022-03-18 PROCEDURE — 86481 TB AG RESPONSE T-CELL SUSP: CPT | Performed by: INTERNAL MEDICINE

## 2022-03-18 RX ORDER — ESCITALOPRAM OXALATE 10 MG/1
TABLET ORAL
Qty: 90 TABLET | Refills: 0 | Status: SHIPPED | OUTPATIENT
Start: 2022-03-18 | End: 2022-07-26

## 2022-03-18 NOTE — TELEPHONE ENCOUNTER
"Routing refill request to provider for review/approval because:  Labs out of range:  PHQ9    Requested Prescriptions   Pending Prescriptions Disp Refills     escitalopram (LEXAPRO) 10 MG tablet [Pharmacy Med Name: ESCITALOPRAM 10MG TABLETS] 90 tablet 0     Sig: TAKE 1 TABLET(10 MG) BY MOUTH DAILY       SSRIs Protocol Failed - 3/18/2022 11:49 AM        Failed - PHQ-9 score less than 5 in past 6 months     Please review last PHQ-9 score.           Passed - Medication is active on med list        Passed - Patient is age 18 or older        Passed - No active pregnancy on record        Passed - No positive pregnancy test in last 12 months        Passed - Recent (6 mo) or future (30 days) visit within the authorizing provider's specialty     Patient had office visit in the last 6 months or has a visit in the next 30 days with authorizing provider or within the authorizing provider's specialty.  See \"Patient Info\" tab in inbasket, or \"Choose Columns\" in Meds & Orders section of the refill encounter.               PHQ-9 score:    PHQ 3/18/2022   PHQ-9 Total Score 8   Q9: Thoughts of better off dead/self-harm past 2 weeks Not at all         Keila LEON RN, BSN  [facility]        "

## 2022-03-18 NOTE — TELEPHONE ENCOUNTER
Sending PHQ9 for refill via [facility].    Keila LEON RN, BSN  [facility]

## 2022-03-21 LAB
GAMMA INTERFERON BACKGROUND BLD IA-ACNC: 0.08 IU/ML
M TB IFN-G BLD-IMP: NEGATIVE
M TB IFN-G CD4+ BCKGRND COR BLD-ACNC: 9.92 IU/ML
MITOGEN IGNF BCKGRD COR BLD-ACNC: 0.01 IU/ML
MITOGEN IGNF BCKGRD COR BLD-ACNC: 0.1 IU/ML
QUANTIFERON MITOGEN: 10 IU/ML
QUANTIFERON NIL TUBE: 0.08 IU/ML
QUANTIFERON TB1 TUBE: 0.09 IU/ML
QUANTIFERON TB2 TUBE: 0.18

## 2022-07-02 ENCOUNTER — HEALTH MAINTENANCE LETTER (OUTPATIENT)
Age: 22
End: 2022-07-02

## 2022-08-05 DIAGNOSIS — N92.6 IRREGULAR PERIODS: ICD-10-CM

## 2022-08-05 DIAGNOSIS — N94.6 DYSMENORRHEA: ICD-10-CM

## 2022-08-05 RX ORDER — NORGESTIMATE AND ETHINYL ESTRADIOL 0.25-0.035
KIT ORAL
Qty: 84 TABLET | Refills: 1 | Status: SHIPPED | OUTPATIENT
Start: 2022-08-05 | End: 2022-09-02

## 2022-08-05 NOTE — TELEPHONE ENCOUNTER
"Requested Prescriptions   Signed Prescriptions Disp Refills    ESTARYLLA 0.25-35 MG-MCG tablet 84 tablet 1     Sig: TAKE 1 TABLET BY MOUTH DAILY       Contraceptives Protocol Passed - 8/5/2022  9:11 AM        Passed - Patient is not a current smoker if age is 35 or older        Passed - Recent (12 mo) or future (30 days) visit within the authorizing provider's specialty     Patient has had an office visit with the authorizing provider or a provider within the authorizing providers department within the previous 12 mos or has a future within next 30 days. See \"Patient Info\" tab in inbasket, or \"Choose Columns\" in Meds & Orders section of the refill encounter.              Passed - Medication is active on med list        Passed - No active pregnancy on record        Passed - No positive pregnancy test in past 12 months           Tanisha Ventura RN  [facility]     "

## 2022-08-12 DIAGNOSIS — F41.1 GENERALIZED ANXIETY DISORDER: ICD-10-CM

## 2022-08-12 DIAGNOSIS — F33.1 MAJOR DEPRESSIVE DISORDER, RECURRENT EPISODE, MODERATE (H): ICD-10-CM

## 2022-08-12 RX ORDER — ESCITALOPRAM OXALATE 10 MG/1
TABLET ORAL
Qty: 90 TABLET | Refills: 1 | Status: SHIPPED | OUTPATIENT
Start: 2022-08-12 | End: 2022-09-02 | Stop reason: DRUGHIGH

## 2022-08-12 NOTE — TELEPHONE ENCOUNTER
Routing refill request to provider for review/approval because:  phq-9 score is over 5      Lexis Richmond, RN, BSN

## 2022-08-17 ENCOUNTER — MYC REFILL (OUTPATIENT)
Dept: FAMILY MEDICINE | Facility: CLINIC | Age: 22
End: 2022-08-17

## 2022-08-17 DIAGNOSIS — F33.1 MAJOR DEPRESSIVE DISORDER, RECURRENT EPISODE, MODERATE (H): ICD-10-CM

## 2022-08-17 DIAGNOSIS — F41.1 GENERALIZED ANXIETY DISORDER: ICD-10-CM

## 2022-08-18 ENCOUNTER — MYC REFILL (OUTPATIENT)
Dept: FAMILY MEDICINE | Facility: CLINIC | Age: 22
End: 2022-08-18

## 2022-08-18 DIAGNOSIS — F33.1 MAJOR DEPRESSIVE DISORDER, RECURRENT EPISODE, MODERATE (H): ICD-10-CM

## 2022-08-18 DIAGNOSIS — F41.1 GENERALIZED ANXIETY DISORDER: ICD-10-CM

## 2022-08-18 RX ORDER — BUPROPION HYDROCHLORIDE 150 MG/1
150 TABLET ORAL EVERY MORNING
Qty: 30 TABLET | Refills: 0 | Status: SHIPPED | OUTPATIENT
Start: 2022-08-18 | End: 2022-09-02

## 2022-08-18 NOTE — TELEPHONE ENCOUNTER
Routing refill request to provider for review/approval because:  Failed Protocol    Althea Flores RN BSN  [facility]

## 2022-08-19 RX ORDER — BUPROPION HYDROCHLORIDE 150 MG/1
150 TABLET ORAL EVERY MORNING
Qty: 30 TABLET | Refills: 0 | Status: SHIPPED | OUTPATIENT
Start: 2022-08-19 | End: 2022-09-02

## 2022-08-19 NOTE — TELEPHONE ENCOUNTER
Routing refill request to provider for review/approval because:  Failed Protocol    Althea Flores RN BSN  [facility]

## 2022-09-02 ENCOUNTER — OFFICE VISIT (OUTPATIENT)
Dept: FAMILY MEDICINE | Facility: CLINIC | Age: 22
End: 2022-09-02
Payer: COMMERCIAL

## 2022-09-02 VITALS
HEART RATE: 88 BPM | WEIGHT: 137 LBS | DIASTOLIC BLOOD PRESSURE: 80 MMHG | OXYGEN SATURATION: 97 % | BODY MASS INDEX: 25.47 KG/M2 | TEMPERATURE: 98.6 F | SYSTOLIC BLOOD PRESSURE: 120 MMHG

## 2022-09-02 DIAGNOSIS — F41.1 GENERALIZED ANXIETY DISORDER: ICD-10-CM

## 2022-09-02 DIAGNOSIS — Z11.4 SCREENING FOR HIV (HUMAN IMMUNODEFICIENCY VIRUS): ICD-10-CM

## 2022-09-02 DIAGNOSIS — F33.1 MAJOR DEPRESSIVE DISORDER, RECURRENT EPISODE, MODERATE (H): Primary | ICD-10-CM

## 2022-09-02 DIAGNOSIS — Z12.4 CERVICAL CANCER SCREENING: ICD-10-CM

## 2022-09-02 DIAGNOSIS — Z11.3 SCREEN FOR STD (SEXUALLY TRANSMITTED DISEASE): ICD-10-CM

## 2022-09-02 DIAGNOSIS — Z11.59 NEED FOR HEPATITIS C SCREENING TEST: ICD-10-CM

## 2022-09-02 LAB
HCV AB SERPL QL IA: NONREACTIVE
HIV 1+2 AB+HIV1 P24 AG SERPL QL IA: NONREACTIVE

## 2022-09-02 PROCEDURE — 36415 COLL VENOUS BLD VENIPUNCTURE: CPT | Performed by: PHYSICIAN ASSISTANT

## 2022-09-02 PROCEDURE — G0145 SCR C/V CYTO,THINLAYER,RESCR: HCPCS | Performed by: PHYSICIAN ASSISTANT

## 2022-09-02 PROCEDURE — 87491 CHLMYD TRACH DNA AMP PROBE: CPT | Performed by: PHYSICIAN ASSISTANT

## 2022-09-02 PROCEDURE — 99214 OFFICE O/P EST MOD 30 MIN: CPT | Performed by: PHYSICIAN ASSISTANT

## 2022-09-02 PROCEDURE — 87591 N.GONORRHOEAE DNA AMP PROB: CPT | Performed by: PHYSICIAN ASSISTANT

## 2022-09-02 PROCEDURE — 86803 HEPATITIS C AB TEST: CPT | Performed by: PHYSICIAN ASSISTANT

## 2022-09-02 PROCEDURE — 87389 HIV-1 AG W/HIV-1&-2 AB AG IA: CPT | Performed by: PHYSICIAN ASSISTANT

## 2022-09-02 RX ORDER — BUPROPION HYDROCHLORIDE 150 MG/1
150 TABLET ORAL EVERY MORNING
Qty: 90 TABLET | Refills: 0 | Status: SHIPPED | OUTPATIENT
Start: 2022-09-02 | End: 2023-01-30

## 2022-09-02 RX ORDER — ESCITALOPRAM OXALATE 20 MG/1
20 TABLET ORAL DAILY
Qty: 30 TABLET | Refills: 1 | Status: SHIPPED | OUTPATIENT
Start: 2022-09-02 | End: 2023-03-08

## 2022-09-02 RX ORDER — HYDROXYZINE HYDROCHLORIDE 25 MG/1
25 TABLET, FILM COATED ORAL
Qty: 90 TABLET | Refills: 1 | Status: SHIPPED | OUTPATIENT
Start: 2022-09-02 | End: 2023-03-08

## 2022-09-02 ASSESSMENT — ANXIETY QUESTIONNAIRES
7. FEELING AFRAID AS IF SOMETHING AWFUL MIGHT HAPPEN: NOT AT ALL
GAD7 TOTAL SCORE: 7
7. FEELING AFRAID AS IF SOMETHING AWFUL MIGHT HAPPEN: NOT AT ALL
GAD7 TOTAL SCORE: 7
3. WORRYING TOO MUCH ABOUT DIFFERENT THINGS: MORE THAN HALF THE DAYS
4. TROUBLE RELAXING: SEVERAL DAYS
5. BEING SO RESTLESS THAT IT IS HARD TO SIT STILL: NOT AT ALL
8. IF YOU CHECKED OFF ANY PROBLEMS, HOW DIFFICULT HAVE THESE MADE IT FOR YOU TO DO YOUR WORK, TAKE CARE OF THINGS AT HOME, OR GET ALONG WITH OTHER PEOPLE?: SOMEWHAT DIFFICULT
6. BECOMING EASILY ANNOYED OR IRRITABLE: SEVERAL DAYS
2. NOT BEING ABLE TO STOP OR CONTROL WORRYING: MORE THAN HALF THE DAYS
GAD7 TOTAL SCORE: 7
IF YOU CHECKED OFF ANY PROBLEMS ON THIS QUESTIONNAIRE, HOW DIFFICULT HAVE THESE PROBLEMS MADE IT FOR YOU TO DO YOUR WORK, TAKE CARE OF THINGS AT HOME, OR GET ALONG WITH OTHER PEOPLE: SOMEWHAT DIFFICULT
1. FEELING NERVOUS, ANXIOUS, OR ON EDGE: SEVERAL DAYS

## 2022-09-02 ASSESSMENT — PATIENT HEALTH QUESTIONNAIRE - PHQ9
SUM OF ALL RESPONSES TO PHQ QUESTIONS 1-9: 9
10. IF YOU CHECKED OFF ANY PROBLEMS, HOW DIFFICULT HAVE THESE PROBLEMS MADE IT FOR YOU TO DO YOUR WORK, TAKE CARE OF THINGS AT HOME, OR GET ALONG WITH OTHER PEOPLE: SOMEWHAT DIFFICULT
SUM OF ALL RESPONSES TO PHQ QUESTIONS 1-9: 9

## 2022-09-02 NOTE — PROGRESS NOTES
Assessment & Plan     Screening for HIV (human immunodeficiency virus)    - HIV Antigen Antibody Combo; Future  - HIV Antigen Antibody Combo    Need for hepatitis C screening test    - Hepatitis C Screen Reflex to HCV RNA Quant and Genotype; Future  - Hepatitis C Screen Reflex to HCV RNA Quant and Genotype    Cervical cancer screening    - Pap Screen only - recommended age 21 - 24 years    Generalized anxiety disorder  Increase lexapro.  Continue with therapy   - escitalopram (LEXAPRO) 20 MG tablet; Take 1 tablet (20 mg) by mouth daily  - hydrOXYzine (ATARAX) 25 MG tablet; Take 1 tablet (25 mg) by mouth nightly as needed for anxiety  - buPROPion (WELLBUTRIN XL) 150 MG 24 hr tablet; Take 1 tablet (150 mg) by mouth every morning    Major depressive disorder, recurrent episode, moderate (H)  As above  - escitalopram (LEXAPRO) 20 MG tablet; Take 1 tablet (20 mg) by mouth daily  - hydrOXYzine (ATARAX) 25 MG tablet; Take 1 tablet (25 mg) by mouth nightly as needed for anxiety  - buPROPion (WELLBUTRIN XL) 150 MG 24 hr tablet; Take 1 tablet (150 mg) by mouth every morning    Screen for STD (sexually transmitted disease)  Follow up as needed. Routine screening   - Neisseria gonorrhoeae PCR  - Chlamydia trachomatis PCR                 Return in about 6 weeks (around 10/14/2022) for mood-can be virtual .    Nuria Turner PA-C  [facility] KARIE Pyle is a 22 year old, presenting for the following health issues:  Hypertension, Recheck Medication, and Depression      History of Present Illness       Mental Health Follow-up:  Patient presents to follow-up on Depression & Anxiety.Patient's depression since last visit has been:  Medium  The patient is not having other symptoms associated with depression.  Patient's anxiety since last visit has been:  Bad  The patient is not having other symptoms associated with anxiety.  Any significant life events: grief or loss  Patient is feeling  anxious or having panic attacks.  Patient has no concerns about alcohol or drug use.    She eats 2-3 servings of fruits and vegetables daily.She consumes 0 sweetened beverage(s) daily.She exercises with enough effort to increase her heart rate 30 to 60 minutes per day.  She exercises with enough effort to increase her heart rate 3 or less days per week. She is missing 1 dose(s) of medications per week.    Today's PHQ-9         PHQ-9 Total Score: 9    PHQ-9 Q9 Thoughts of better off dead/self-harm past 2 weeks :   Not at all    How difficult have these problems made it for you to do your work, take care of things at home, or get along with other people: Somewhat difficult  Today's THEODORA-7 Score: 7       Wants to stop birth control.  Has been on for 10 years.  Didn't help pain.  Ok if she gets pregnant.  In a stable relationship.      Mood is ok.  More anxious.  Hydroxyzine helps.  Has had recent death of her grandma which worsened her anxiety.  Rare panic attacks.    Still seeing therapist.          Review of Systems   As above      Objective    /80 (BP Location: Left arm, Patient Position: Chair, Cuff Size: Adult Regular)   Pulse 88   Temp 98.6  F (37  C) (Oral)   Wt 62.1 kg (137 lb)   SpO2 97%   BMI 25.47 kg/m    Body mass index is 25.47 kg/m .  Physical Exam  Constitutional:       General: She is not in acute distress.  Genitourinary:     Vagina: Vaginal discharge present.      Cervix: Normal.   Neurological:      Mental Status: She is alert.   Psychiatric:         Mood and Affect: Mood normal.                            [unfilled]  [unfilled]

## 2022-09-03 LAB
C TRACH DNA SPEC QL NAA+PROBE: NEGATIVE
N GONORRHOEA DNA SPEC QL NAA+PROBE: NEGATIVE

## 2022-09-07 LAB
BKR LAB AP GYN ADEQUACY: NORMAL
BKR LAB AP GYN INTERPRETATION: NORMAL
BKR LAB AP HPV REFLEX: NO
BKR LAB AP PREVIOUS ABNORMAL: NORMAL
PATH REPORT.COMMENTS IMP SPEC: NORMAL
PATH REPORT.COMMENTS IMP SPEC: NORMAL
PATH REPORT.RELEVANT HX SPEC: NORMAL

## 2023-01-28 DIAGNOSIS — F41.1 GENERALIZED ANXIETY DISORDER: ICD-10-CM

## 2023-01-28 DIAGNOSIS — F33.1 MAJOR DEPRESSIVE DISORDER, RECURRENT EPISODE, MODERATE (H): ICD-10-CM

## 2023-01-30 RX ORDER — BUPROPION HYDROCHLORIDE 150 MG/1
TABLET ORAL
Qty: 90 TABLET | Refills: 0 | Status: SHIPPED | OUTPATIENT
Start: 2023-01-30 | End: 2023-03-08 | Stop reason: SINTOL

## 2023-03-01 ASSESSMENT — ANXIETY QUESTIONNAIRES
GAD7 TOTAL SCORE: 8
4. TROUBLE RELAXING: SEVERAL DAYS
1. FEELING NERVOUS, ANXIOUS, OR ON EDGE: MORE THAN HALF THE DAYS
2. NOT BEING ABLE TO STOP OR CONTROL WORRYING: SEVERAL DAYS
GAD7 TOTAL SCORE: 8
GAD7 TOTAL SCORE: 8
7. FEELING AFRAID AS IF SOMETHING AWFUL MIGHT HAPPEN: NOT AT ALL
IF YOU CHECKED OFF ANY PROBLEMS ON THIS QUESTIONNAIRE, HOW DIFFICULT HAVE THESE PROBLEMS MADE IT FOR YOU TO DO YOUR WORK, TAKE CARE OF THINGS AT HOME, OR GET ALONG WITH OTHER PEOPLE: SOMEWHAT DIFFICULT
5. BEING SO RESTLESS THAT IT IS HARD TO SIT STILL: SEVERAL DAYS
3. WORRYING TOO MUCH ABOUT DIFFERENT THINGS: SEVERAL DAYS
6. BECOMING EASILY ANNOYED OR IRRITABLE: MORE THAN HALF THE DAYS
7. FEELING AFRAID AS IF SOMETHING AWFUL MIGHT HAPPEN: NOT AT ALL
8. IF YOU CHECKED OFF ANY PROBLEMS, HOW DIFFICULT HAVE THESE MADE IT FOR YOU TO DO YOUR WORK, TAKE CARE OF THINGS AT HOME, OR GET ALONG WITH OTHER PEOPLE?: SOMEWHAT DIFFICULT

## 2023-03-01 ASSESSMENT — PATIENT HEALTH QUESTIONNAIRE - PHQ9
SUM OF ALL RESPONSES TO PHQ QUESTIONS 1-9: 7
SUM OF ALL RESPONSES TO PHQ QUESTIONS 1-9: 7
10. IF YOU CHECKED OFF ANY PROBLEMS, HOW DIFFICULT HAVE THESE PROBLEMS MADE IT FOR YOU TO DO YOUR WORK, TAKE CARE OF THINGS AT HOME, OR GET ALONG WITH OTHER PEOPLE: SOMEWHAT DIFFICULT

## 2023-03-08 ENCOUNTER — OFFICE VISIT (OUTPATIENT)
Dept: FAMILY MEDICINE | Facility: CLINIC | Age: 23
End: 2023-03-08
Payer: COMMERCIAL

## 2023-03-08 VITALS
BODY MASS INDEX: 26.13 KG/M2 | HEIGHT: 62 IN | RESPIRATION RATE: 18 BRPM | SYSTOLIC BLOOD PRESSURE: 132 MMHG | TEMPERATURE: 98.6 F | WEIGHT: 142 LBS | HEART RATE: 76 BPM | OXYGEN SATURATION: 98 % | DIASTOLIC BLOOD PRESSURE: 79 MMHG

## 2023-03-08 DIAGNOSIS — F41.1 GENERALIZED ANXIETY DISORDER: Primary | ICD-10-CM

## 2023-03-08 DIAGNOSIS — F33.1 MAJOR DEPRESSIVE DISORDER, RECURRENT EPISODE, MODERATE (H): ICD-10-CM

## 2023-03-08 PROCEDURE — 99214 OFFICE O/P EST MOD 30 MIN: CPT | Performed by: PHYSICIAN ASSISTANT

## 2023-03-08 PROCEDURE — 36415 COLL VENOUS BLD VENIPUNCTURE: CPT | Performed by: PHYSICIAN ASSISTANT

## 2023-03-08 PROCEDURE — 84443 ASSAY THYROID STIM HORMONE: CPT | Performed by: PHYSICIAN ASSISTANT

## 2023-03-08 RX ORDER — DESVENLAFAXINE 50 MG/1
50 TABLET, FILM COATED, EXTENDED RELEASE ORAL DAILY
Qty: 30 TABLET | Refills: 1 | Status: SHIPPED | OUTPATIENT
Start: 2023-03-08 | End: 2024-04-17 | Stop reason: SINTOL

## 2023-03-08 RX ORDER — HYDROXYZINE HYDROCHLORIDE 25 MG/1
25 TABLET, FILM COATED ORAL
Qty: 90 TABLET | Refills: 1 | Status: SHIPPED | OUTPATIENT
Start: 2023-03-08 | End: 2024-04-17

## 2023-03-08 ASSESSMENT — ANXIETY QUESTIONNAIRES: GAD7 TOTAL SCORE: 8

## 2023-03-08 ASSESSMENT — PATIENT HEALTH QUESTIONNAIRE - PHQ9
10. IF YOU CHECKED OFF ANY PROBLEMS, HOW DIFFICULT HAVE THESE PROBLEMS MADE IT FOR YOU TO DO YOUR WORK, TAKE CARE OF THINGS AT HOME, OR GET ALONG WITH OTHER PEOPLE: SOMEWHAT DIFFICULT
SUM OF ALL RESPONSES TO PHQ QUESTIONS 1-9: 7

## 2023-03-08 NOTE — PROGRESS NOTES
"  Assessment & Plan     Generalized anxiety disorder  Try pristiq since failed two selective serotonin reuptake inhibitors   Check thyroid.    - desvenlafaxine (PRISTIQ) 50 MG 24 hr tablet; Take 1 tablet (50 mg) by mouth daily  - TSH with free T4 reflex; Future  - hydrOXYzine (ATARAX) 25 MG tablet; Take 1 tablet (25 mg) by mouth nightly as needed for anxiety  - TSH with free T4 reflex    Major depressive disorder, recurrent episode, moderate (H)  As above  - desvenlafaxine (PRISTIQ) 50 MG 24 hr tablet; Take 1 tablet (50 mg) by mouth daily  - hydrOXYzine (ATARAX) 25 MG tablet; Take 1 tablet (25 mg) by mouth nightly as needed for anxiety             BMI:   Estimated body mass index is 26.4 kg/m  as calculated from the following:    Height as of this encounter: 1.562 m (5' 1.5\").    Weight as of this encounter: 64.4 kg (142 lb).   Weight management plan: not addressed        Return in about 3 weeks (around 3/29/2023) for mood virtual .    Nuria Turner PA-C  [facility] [place] is a 22 year old accompanied by her self , presenting for the following health issues:  Recheck Medication      History of Present Illness       Mental Health Follow-up:  Patient presents to follow-up on Depression & Anxiety.Patient's depression since last visit has been:  Good  The patient is having other symptoms associated with depression.  Patient's anxiety since last visit has been:  Worse  The patient is having other symptoms associated with anxiety.  Any significant life events: No  Patient is feeling anxious or having panic attacks.  Patient has no concerns about alcohol or drug use.    She eats 2-3 servings of fruits and vegetables daily.She consumes 1 sweetened beverage(s) daily.She exercises with enough effort to increase her heart rate 30 to 60 minutes per day.  She exercises with enough effort to increase her heart rate 3 or less days per week. She is missing 2 dose(s) of medications per " "week.  She is not taking prescribed medications regularly due to side effects.    Today's PHQ-9         PHQ-9 Total Score: 7    PHQ-9 Q9 Thoughts of better off dead/self-harm past 2 weeks :   Not at all    How difficult have these problems made it for you to do your work, take care of things at home, or get along with other people: Somewhat difficult  Today's THEODORA-7 Score: 8     Hard to sleep and heart races.  Wellbutrin does make anxiety worse.  hydroxyzine helps with sleep   Tried prozac in past too -side effects.  lexpro caused dizziness and she had stopped a while ago.  Still seeing therapist.    Most bothered by the physical symptoms of her anxiety.            Review of Systems   As above      Objective    /79   Pulse 76   Temp 98.6  F (37  C) (Oral)   Resp 18   Ht 1.562 m (5' 1.5\")   Wt 64.4 kg (142 lb)   SpO2 98%   BMI 26.40 kg/m    Body mass index is 26.4 kg/m .  Physical Exam  Constitutional:       General: She is not in acute distress.  Cardiovascular:      Rate and Rhythm: Normal rate and regular rhythm.   Pulmonary:      Effort: Pulmonary effort is normal.      Breath sounds: Normal breath sounds.   Neurological:      Mental Status: She is alert.   Psychiatric:         Mood and Affect: Mood normal.                            "

## 2023-03-09 LAB — TSH SERPL DL<=0.005 MIU/L-ACNC: 1.08 MU/L (ref 0.4–4)

## 2023-04-11 ENCOUNTER — MYC MEDICAL ADVICE (OUTPATIENT)
Dept: FAMILY MEDICINE | Facility: CLINIC | Age: 23
End: 2023-04-11
Payer: COMMERCIAL

## 2023-05-31 NOTE — TELEPHONE ENCOUNTER
"Left message on patient's unidentified voice mail.  Advised patient to call [phone] at her earliest convenience and speak to one of the nurses.    Patient was started on  buPROPion (WELLBUTRIN XL) 150 MG 24 hr tablet on 3/10/21.    Is she taking this medication?  Has she stopped taking the FLUoxetine (PROZAC) 20 MG capsule?  How is she feeling with the new medication?  Assist with scheduling the follow-up appointment around 4/7/21.      Last visit: 3/10/21    Generalized anxiety disorder  Try wellbutrin instead due to fatigue.  Add hydroxyzine for sleep  - buPROPion (WELLBUTRIN XL) 150 MG 24 hr tablet; Take 1 tablet (150 mg) by mouth every morning  - hydrOXYzine (ATARAX) 25 MG tablet; Take 1 tablet (25 mg) by mouth nightly as needed for anxiety     Major depressive disorder, recurrent episode, moderate (H)  As above  - buPROPion (WELLBUTRIN XL) 150 MG 24 hr tablet; Take 1 tablet (150 mg) by mouth every morning  - hydrOXYzine (ATARAX) 25 MG tablet; Take 1 tablet (25 mg) by mouth nightly as needed for anxiety  Last PHQ-9: 2/20/21    Return in about 4 weeks (around 4/7/2021) for mood.      Requested Prescriptions   Pending Prescriptions Disp Refills     FLUoxetine (PROZAC) 20 MG capsule 30 capsule 0       SSRIs Protocol Failed - 3/25/2021  8:56 AM        Failed - PHQ-9 score less than 5 in past 6 months     Please review last PHQ-9 score.           Passed - Medication is active on med list        Passed - Patient is age 18 or older        Passed - No active pregnancy on record        Passed - No positive pregnancy test in last 12 months        Passed - Recent (6 mo) or future (30 days) visit within the authorizing provider's specialty     Patient had office visit in the last 6 months or has a visit in the next 30 days with authorizing provider or within the authorizing provider's specialty.  See \"Patient Info\" tab in inbasket, or \"Choose Columns\" in Meds & Orders section of the refill encounter.                 " symmetrical

## 2023-07-15 ENCOUNTER — HEALTH MAINTENANCE LETTER (OUTPATIENT)
Age: 23
End: 2023-07-15

## 2024-04-17 ENCOUNTER — OFFICE VISIT (OUTPATIENT)
Dept: FAMILY MEDICINE | Facility: CLINIC | Age: 24
End: 2024-04-17
Payer: COMMERCIAL

## 2024-04-17 VITALS
TEMPERATURE: 98.9 F | DIASTOLIC BLOOD PRESSURE: 77 MMHG | SYSTOLIC BLOOD PRESSURE: 118 MMHG | WEIGHT: 128 LBS | HEART RATE: 74 BPM | RESPIRATION RATE: 13 BRPM | BODY MASS INDEX: 24.17 KG/M2 | OXYGEN SATURATION: 98 % | HEIGHT: 61 IN

## 2024-04-17 DIAGNOSIS — N94.6 DYSMENORRHEA: Primary | ICD-10-CM

## 2024-04-17 DIAGNOSIS — F33.1 MAJOR DEPRESSIVE DISORDER, RECURRENT EPISODE, MODERATE (H): ICD-10-CM

## 2024-04-17 DIAGNOSIS — F41.1 GENERALIZED ANXIETY DISORDER: ICD-10-CM

## 2024-04-17 PROCEDURE — 96127 BRIEF EMOTIONAL/BEHAV ASSMT: CPT | Performed by: PHYSICIAN ASSISTANT

## 2024-04-17 PROCEDURE — 99214 OFFICE O/P EST MOD 30 MIN: CPT | Performed by: PHYSICIAN ASSISTANT

## 2024-04-17 PROCEDURE — G2211 COMPLEX E/M VISIT ADD ON: HCPCS | Performed by: PHYSICIAN ASSISTANT

## 2024-04-17 RX ORDER — HYDROXYZINE HYDROCHLORIDE 25 MG/1
25 TABLET, FILM COATED ORAL
Qty: 90 TABLET | Refills: 1 | Status: SHIPPED | OUTPATIENT
Start: 2024-04-17

## 2024-04-17 ASSESSMENT — PATIENT HEALTH QUESTIONNAIRE - PHQ9
SUM OF ALL RESPONSES TO PHQ QUESTIONS 1-9: 8
10. IF YOU CHECKED OFF ANY PROBLEMS, HOW DIFFICULT HAVE THESE PROBLEMS MADE IT FOR YOU TO DO YOUR WORK, TAKE CARE OF THINGS AT HOME, OR GET ALONG WITH OTHER PEOPLE: SOMEWHAT DIFFICULT
SUM OF ALL RESPONSES TO PHQ QUESTIONS 1-9: 8

## 2024-04-17 NOTE — PROGRESS NOTES
"  Assessment & Plan     Generalized anxiety disorder  Stable.  refilled  - hydrOXYzine HCl (ATARAX) 25 MG tablet; Take 1 tablet (25 mg) by mouth nightly as needed for anxiety    Major depressive disorder, recurrent episode, moderate (H)  As above  - hydrOXYzine HCl (ATARAX) 25 MG tablet; Take 1 tablet (25 mg) by mouth nightly as needed for anxiety    Dysmenorrhea  She is ok monitoring for now.  May consider restarting ocp in future.      The longitudinal plan of care for the diagnosis(es)/condition(s) as documented were addressed during this visit. Due to the added complexity in care, I will continue to support Lashanda in the subsequent management and with ongoing continuity of care.                    Subjective   Lashanda is a 23 year old, presenting for the following health issues:  Follow Up (Rupture cyst on ovary) and Sinus Problem (Pressure and headach)    History of Present Illness       Reason for visit:  Follow up from a visit to ER with ovarian cyst rupture/snius issues    She eats 0-1 servings of fruits and vegetables daily.She consumes 1 sweetened beverage(s) daily.She exercises with enough effort to increase her heart rate 30 to 60 minutes per day.  She exercises with enough effort to increase her heart rate 3 or less days per week.   She is taking medications regularly.     Since this has had irregular periods.  Has been off birth control for 2 years.  Home pregnancy test negative.  Her grandpa just passed and that has been stressful.  Still feeling anxious.    Stomach pain that took her to the ER has resolved.  Periods are getting more painful then in past.  Still not ready to start ocp.      Had a sinus infection about a month ago.  Nose is still swollen and has sinus pressure in am.  Tried nasal spray and didn't help.      Hydroxyzine helps with anxiety.  Doesn't need a daily med                Objective    /77   Pulse 74   Temp 98.9  F (37.2  C)   Resp 13   Ht 5' 1\" (1.549 m)   Wt 128 lb " (58.1 kg)   SpO2 98%   BMI 24.19 kg/m    Body mass index is 24.19 kg/m .  Physical Exam  Constitutional:       General: She is not in acute distress.  HENT:      Right Ear: Tympanic membrane, ear canal and external ear normal.      Left Ear: Tympanic membrane, ear canal and external ear normal.      Nose:      Right Turbinates: Enlarged.      Left Turbinates: Enlarged.      Right Sinus: Maxillary sinus tenderness present.      Left Sinus: Maxillary sinus tenderness present.      Mouth/Throat:      Mouth: Mucous membranes are moist.      Pharynx: No oropharyngeal exudate or posterior oropharyngeal erythema.   Cardiovascular:      Rate and Rhythm: Normal rate and regular rhythm.   Pulmonary:      Effort: Pulmonary effort is normal.      Breath sounds: Normal breath sounds.   Abdominal:      General: Bowel sounds are normal.      Tenderness: There is no abdominal tenderness.   Neurological:      Mental Status: She is alert.                      Signed Electronically by: Nuria Turner PA-C

## 2024-06-24 ENCOUNTER — ANCILLARY PROCEDURE (OUTPATIENT)
Dept: GENERAL RADIOLOGY | Facility: CLINIC | Age: 24
End: 2024-06-24
Payer: COMMERCIAL

## 2024-06-24 ENCOUNTER — OFFICE VISIT (OUTPATIENT)
Dept: URGENT CARE | Facility: URGENT CARE | Age: 24
End: 2024-06-24
Payer: COMMERCIAL

## 2024-06-24 VITALS
BODY MASS INDEX: 25.51 KG/M2 | DIASTOLIC BLOOD PRESSURE: 83 MMHG | WEIGHT: 135 LBS | HEART RATE: 74 BPM | TEMPERATURE: 98.8 F | SYSTOLIC BLOOD PRESSURE: 116 MMHG | OXYGEN SATURATION: 98 %

## 2024-06-24 DIAGNOSIS — S09.92XA INJURY OF NOSE, INITIAL ENCOUNTER: ICD-10-CM

## 2024-06-24 DIAGNOSIS — S09.92XA INJURY OF NOSE, INITIAL ENCOUNTER: Primary | ICD-10-CM

## 2024-06-24 PROCEDURE — 70160 X-RAY EXAM OF NASAL BONES: CPT | Mod: TC | Performed by: RADIOLOGY

## 2024-06-24 PROCEDURE — 99214 OFFICE O/P EST MOD 30 MIN: CPT

## 2024-06-24 NOTE — PROGRESS NOTES
Assessment & Plan     Injury of nose, initial encounter  Patient presents to the clinic for a complaint of possible broken nose. She was getting back into a boat over the weekend and slipped and hit her nose on the edge of the boat. She stated it bled quite a bit and took awhile to stop. Will obtain Xray and refer to ENT if necessary.   - XR Nasal Bones 3 Views     30 minutes spent by me on the date of the encounter doing chart review, review of test results, interpretation of tests, patient visit, and documentation         No follow-ups on file.    NITISH Osorio CNP  [facility] [place] is a 24 year old female who presents to clinic today for the following health issues:  Chief Complaint   Patient presents with    Urgent Care     Poss broken nose, believes it nose might be fractured due to impact from a  boat  x Saturday , pain bruising on upper mouth        HPI    Patient is here for a possible broken nose. She was hanging out on a boat and as she was getting out of the boat, she slipped and hit nose on the edge of the boat. It bled quite a bit.  Patient states it hurts the worst right at the base of the septum.         Review of Systems  Constitutional, HEENT, cardiovascular, pulmonary, gi and gu systems are negative, except as otherwise noted.      Objective    /83   Pulse 74   Temp 98.8  F (37.1  C) (Tympanic)   Wt 61.2 kg (135 lb)   SpO2 98%   BMI 25.51 kg/m    Physical Exam   GENERAL: alert and no distress  EYES: Eyes grossly normal to inspection, PERRL and conjunctivae and sclerae normal  HENT: ear canals and TM's normal, nose and mouth without ulcers or lesions  NECK: no adenopathy, no asymmetry, masses, or scars

## 2024-09-07 ENCOUNTER — HEALTH MAINTENANCE LETTER (OUTPATIENT)
Age: 24
End: 2024-09-07

## 2024-10-21 ENCOUNTER — OFFICE VISIT (OUTPATIENT)
Dept: FAMILY MEDICINE | Facility: CLINIC | Age: 24
End: 2024-10-21
Payer: COMMERCIAL

## 2024-10-21 VITALS
OXYGEN SATURATION: 99 % | HEIGHT: 61 IN | WEIGHT: 136 LBS | SYSTOLIC BLOOD PRESSURE: 115 MMHG | BODY MASS INDEX: 25.68 KG/M2 | TEMPERATURE: 98.6 F | HEART RATE: 64 BPM | DIASTOLIC BLOOD PRESSURE: 74 MMHG | RESPIRATION RATE: 17 BRPM

## 2024-10-21 DIAGNOSIS — Z01.818 PREOP GENERAL PHYSICAL EXAM: Primary | ICD-10-CM

## 2024-10-21 DIAGNOSIS — J34.2 DEVIATED NASAL SEPTUM: ICD-10-CM

## 2024-10-21 PROCEDURE — 90480 ADMN SARSCOV2 VAC 1/ONLY CMP: CPT | Performed by: PHYSICIAN ASSISTANT

## 2024-10-21 PROCEDURE — 91320 SARSCV2 VAC 30MCG TRS-SUC IM: CPT | Performed by: PHYSICIAN ASSISTANT

## 2024-10-21 PROCEDURE — 99214 OFFICE O/P EST MOD 30 MIN: CPT | Performed by: PHYSICIAN ASSISTANT

## 2024-10-21 ASSESSMENT — PATIENT HEALTH QUESTIONNAIRE - PHQ9
10. IF YOU CHECKED OFF ANY PROBLEMS, HOW DIFFICULT HAVE THESE PROBLEMS MADE IT FOR YOU TO DO YOUR WORK, TAKE CARE OF THINGS AT HOME, OR GET ALONG WITH OTHER PEOPLE: SOMEWHAT DIFFICULT
SUM OF ALL RESPONSES TO PHQ QUESTIONS 1-9: 5
SUM OF ALL RESPONSES TO PHQ QUESTIONS 1-9: 5

## 2024-10-21 NOTE — PROGRESS NOTES
Preoperative Evaluation  [facility]  [address]  [place] [address]  Phone: [phone]  Primary Provider: Nuria Turner PA-C  Pre-op Performing Provider: Nuria Turner PA-C  Oct 21, 2024             10/21/2024   Surgical Information   What procedure is being done? septoplasty smr turbinates bilat   Facility or Hospital where procedure/surgery will be performed: [facility]   Who is doing the procedure / surgery? dr edward   Date of surgery / procedure: [id number]   Time of surgery / procedure: tbd   Where do you plan to recover after surgery? at home with family        Fax number for surgical facility: Note does not need to be faxed, will be available electronically in Epic.    Assessment & Plan     The proposed surgical procedure is considered INTERMEDIATE risk.    Preop general physical exam      Deviated nasal septum  No concerns noted for surgery             - No identified additional risk factors other than previously addressed    Preoperative Medication Instructions  Antiplatelet or Anticoagulation Medication Instructions   - Patient is on no antiplatelet or anticoagulation medications.    Additional Medication Instructions  Take all scheduled medications on the day of surgery    Recommendation  Approval given to proceed with proposed procedure, without further diagnostic evaluation.    Lakeisha Pyle is a 24 year old, presenting for the following:  Pre-Op Exam           No data to display              HPI related to upcoming procedure: deviated septum.  Lots of congestion and difficulty with breathing.          10/21/2024   Pre-Op Questionnaire   Have you ever had a heart attack or stroke? No   Have you ever had surgery on your heart or blood vessels, such as a stent placement, a coronary artery bypass, or surgery on an artery in your head, neck, heart, or legs? No   Do you have chest pain with activity? No   Do you have a history of  heart failure? No   Do you currently have a cold, bronchitis or symptoms of other infection? No   Do you have a cough, shortness of breath, or wheezing? No   Do you or anyone in your family have previous history of blood clots? No   Do you or does anyone in your family have a serious bleeding problem such as prolonged bleeding following surgeries or cuts? No   Have you ever had problems with anemia or been told to take iron pills? No   Have you had any abnormal blood loss such as black, tarry or bloody stools, or abnormal vaginal bleeding? No   Have you ever had a blood transfusion? No   Are you willing to have a blood transfusion if it is medically needed before, during, or after your surgery? Yes   Have you or any of your relatives ever had problems with anesthesia? No   Do you have sleep apnea, excessive snoring or daytime drowsiness? No   Do you have any artifical heart valves or other implanted medical devices like a pacemaker, defibrillator, or continuous glucose monitor? No   Do you have artificial joints? No   Are you allergic to latex? No        Health Care Directive  Patient does not have a Health Care Directive or Living Will: Discussed advance care planning with patient; however, patient declined at this time.    Preoperative Review of    reviewed - no record of controlled substances prescribed.          Patient Active Problem List    Diagnosis Date Noted    Major depressive disorder, recurrent episode, moderate (H) 09/20/2016     Priority: Medium    Dysmenorrhea 06/19/2013     Priority: Medium     Do you wish to do the replacement in the background? yes        Generalized anxiety disorder 06/18/2013     Priority: Medium     Diagnosis updated by automated process. Provider to review and confirm.        No past medical history on file.  Past Surgical History:   Procedure Laterality Date    HC TOOTH EXTRACTION W/FORCEP  05/2019     Current Outpatient Medications   Medication Sig Dispense Refill     "hydrOXYzine HCl (ATARAX) 25 MG tablet Take 1 tablet (25 mg) by mouth nightly as needed for anxiety 90 tablet 1    fluticasone (FLONASE) 50 MCG/ACT nasal spray Spray 1 spray into both nostrils daily 16 g 1       No Known Allergies     Social History     Tobacco Use    Smoking status: Never     Passive exposure: Never    Smokeless tobacco: Never   Substance Use Topics    Alcohol use: Yes     Comment: social     Family History   Problem Relation Age of Onset    Hypertension Father     Diabetes No family hx of     Cerebrovascular Disease No family hx of     Breast Cancer No family hx of     Cancer - colorectal No family hx of     Prostate Cancer No family hx of     Cancer No family hx of     Cardiovascular No family hx of      History   Drug Use Unknown             Review of Systems  CONSTITUTIONAL: NEGATIVE for fever, chills, change in weight  INTEGUMENTARY/SKIN: NEGATIVE for worrisome rashes, moles or lesions  ENT/MOUTH: as above  RESP: NEGATIVE for significant cough or SOB  CV: NEGATIVE for chest pain, palpitations or peripheral edema  GI: NEGATIVE for nausea, abdominal pain, heartburn, or change in bowel habits  : NEGATIVE for frequency, dysuria, or hematuria  MUSCULOSKELETAL: NEGATIVE for significant arthralgias or myalgia  PSYCHIATRIC: NEGATIVE for changes in mood or affect    Objective    /74   Pulse 64   Temp 98.6  F (37  C) (Temporal)   Resp 17   Ht 1.549 m (5' 1\")   Wt 61.7 kg (136 lb)   LMP 10/20/2024   SpO2 99%   BMI 25.70 kg/m     Estimated body mass index is 25.7 kg/m  as calculated from the following:    Height as of this encounter: 1.549 m (5' 1\").    Weight as of this encounter: 61.7 kg (136 lb).  Physical Exam  GENERAL: alert and no distress  HENT: ear canals and TM's normal, oropharynx clear, and oral mucous membranes moist  NECK: no adenopathy, no asymmetry, masses, or scars  RESP: lungs clear to auscultation - no rales, rhonchi or wheezes  CV: regular rate and rhythm, normal S1 S2, " "no S3 or S4, no murmur, click or rub, no peripheral edema  ABDOMEN: soft, nontender, no hepatosplenomegaly, no masses and bowel sounds normal   (female) w/bimanual: normal female external genitalia, normal urethral meatus, normal vaginal mucosa, normal cervix/adnexa/uterus without masses or discharge  MS: no gross musculoskeletal defects noted, no edema  PSYCH: mentation appears normal, affect normal/bright    No results for input(s): \"HGB\", \"PLT\", \"INR\", \"NA\", \"POTASSIUM\", \"CR\", \"A1C\" in the last 8760 hours.     Diagnostics  No labs were ordered during this visit.   No EKG required, no history of coronary heart disease, significant arrhythmia, peripheral arterial disease or other structural heart disease.    Revised Cardiac Risk Index (RCRI)  The patient has the following serious cardiovascular risks for perioperative complications:   - No serious cardiac risks = 0 points     RCRI Interpretation: 0 points: Class I (very low risk - 0.4% complication rate)         Signed Electronically by: Nuria Turner PA-C  A copy of this evaluation report is provided to the requesting physician.        Answers submitted by the patient for this visit:  Patient Health Questionnaire (Submitted on 10/21/2024)  If you checked off any problems, how difficult have these problems made it for you to do your work, take care of things at home, or get along with other people?: Somewhat difficult  PHQ9 TOTAL SCORE: 5    "

## 2024-10-21 NOTE — PATIENT INSTRUCTIONS
How to Take Your Medication Before Surgery  Preoperative Medication Instructions   Antiplatelet or Anticoagulation Medication Instructions   - Patient is on no antiplatelet or anticoagulation medications.    Additional Medication Instructions  Take all scheduled medications on the day of surgery       Patient Education   Preparing for Your Surgery  For Adults  Getting started  In most cases, a nurse will call to review your health history and instructions. They will give you an arrival time based on your scheduled surgery time. Please be ready to share:  Your doctor's clinic name and phone number  Your medical, surgical, and anesthesia history  A list of allergies and sensitivities  A list of medicines, including herbal treatments and over-the-counter drugs  Whether the patient has a legal guardian (ask how to send us the papers in advance)  Note: You may not receive a call if you were seen at our PAC (Preoperative Assessment Center).  Please tell us if you're pregnant--or if there's any chance you might be pregnant. Some surgeries may injure a fetus (unborn baby), so they require a pregnancy test. Surgeries that are safe for a fetus don't always need a test, and you can choose whether to have one.   Preparing for surgery  Within 10 to 30 days of surgery: Have a pre-op exam (sometimes called an H&P, or History and Physical). This can be done at a clinic or pre-operative center.  If you're having a , you may not need this exam. Talk to your care team.  At your pre-op exam, talk to your care team about all medicines you take. (This includes CBD oil and any drugs, such as THC, marijuana, and other forms of cannabis.) If you need to stop any medicine before surgery, ask when to start taking it again.  This is for your safety. Many medicines and drugs can make you bleed too much during surgery. Some change how well surgery (anesthesia) drugs work.  Call your insurance company to let them know you're having  surgery. (If you don't have insurance, call [phone].)  Call your clinic if there's any change in your health. This includes a scrape or scratch near the surgery site, or any signs of a cold (sore throat, runny nose, cough, rash, fever).  Eating and drinking guidelines  For your safety: Unless your surgeon tells you otherwise, follow the guidelines below.  Eat and drink as normal until 8 hours before you arrive for surgery. After that, no food or milk. You can spit out gum when you arrive.  Drink clear liquids until 2 hours before you arrive. These are liquids you can see through, like water, Gatorade, and Propel Water. They also include plain black coffee and tea (no cream or milk).  No alcohol for 24 hours before you arrive. The night before surgery, stop any drinks that contain THC.  If your care team tells you to take medicine on the morning of surgery, it's okay to take it with a sip of water. No other medicines or drugs are allowed (including CBD oil)--follow your care team's instructions.  If you have questions the day of surgery, call your hospital or surgery center.   Preventing infection  Shower or bathe the night before and the morning of surgery. Follow the instructions your clinic gave you. (If no instructions, use regular soap.)  Don't shave or clip hair near your surgery site. We'll remove the hair if needed.  Don't smoke or vape the morning of surgery. No chewing tobacco for 6 hours before you arrive. A nicotine patch is okay. You may spit out nicotine gum when you arrive.  For some surgeries, the surgeon will tell you to fully quit smoking and nicotine.  We will make every effort to keep you safe from infection. We will:  Clean our hands often with soap and water (or an alcohol-based hand rub).  Clean the skin at your surgery site with a special soap that kills germs.  Give you a special gown to keep you warm. (Cold raises the risk of infection.)  Wear hair covers, masks, gowns, and gloves  during surgery.  Give antibiotic medicine, if prescribed. Not all surgeries need this medicine.  What to bring on the day of surgery  Photo ID and insurance card  Copy of your health care directive, if you have one  Glasses and hearing aids (bring cases)  You can't wear contacts during surgery  Inhaler and eye drops, if you use them (tell us about these when you arrive)  CPAP machine or breathing device, if you use them  A few personal items, if spending the night  If you have . . .  A pacemaker, ICD (cardiac defibrillator), or other implant: Bring the ID card.  An implanted stimulator: Bring the remote control.  A legal guardian: Bring a copy of the certified (court-stamped) guardianship papers.  Please remove any jewelry, including body piercings. Leave jewelry and other valuables at home.  If you're going home the day of surgery  You must have a responsible adult drive you home. They should stay with you overnight as well.  If you don't have someone to stay with you, and you aren't safe to go home alone, we may keep you overnight. Insurance often won't pay for this.  After surgery  If it's hard to control your pain or you need more pain medicine, please call your surgeon's office.  Questions?   If you have any questions for your care team, list them here:   ____________________________________________________________________________________________________________________________________________________________________________________________________________________________________________________________  For informational purposes only. Not to replace the advice of your health care provider. Copyright   2003, 2019 [employer]. All rights reserved. Clinically reviewed by Gulshan Curtis MD. [place] [id number] - REV 08/24.

## 2025-03-24 ENCOUNTER — PATIENT OUTREACH (OUTPATIENT)
Dept: CARE COORDINATION | Facility: CLINIC | Age: 25
End: 2025-03-24
Payer: COMMERCIAL

## 2025-04-14 ENCOUNTER — VIRTUAL VISIT (OUTPATIENT)
Dept: URGENT CARE | Facility: CLINIC | Age: 25
End: 2025-04-14
Payer: COMMERCIAL

## 2025-04-14 DIAGNOSIS — Z91.199 NO-SHOW FOR APPOINTMENT: Primary | ICD-10-CM

## 2025-04-14 NOTE — PROGRESS NOTES
Sent link at 8:30 txt and email no response  Called 8:42 am no response  Joined the call at 4/14/2025, 8:31:30 am.  Left the call at 4/14/2025, 8:42:27 am.  You were on the call for 10 minutes 57 seconds.This patient was a no show for this scheduled appointment.